# Patient Record
Sex: MALE | Race: WHITE | Employment: FULL TIME | URBAN - METROPOLITAN AREA
[De-identification: names, ages, dates, MRNs, and addresses within clinical notes are randomized per-mention and may not be internally consistent; named-entity substitution may affect disease eponyms.]

---

## 2021-03-13 ENCOUNTER — APPOINTMENT (OUTPATIENT)
Dept: GENERAL RADIOLOGY | Age: 56
DRG: 309 | End: 2021-03-13
Attending: STUDENT IN AN ORGANIZED HEALTH CARE EDUCATION/TRAINING PROGRAM
Payer: COMMERCIAL

## 2021-03-13 ENCOUNTER — HOSPITAL ENCOUNTER (INPATIENT)
Age: 56
LOS: 1 days | Discharge: HOME | DRG: 309 | End: 2021-03-14
Attending: STUDENT IN AN ORGANIZED HEALTH CARE EDUCATION/TRAINING PROGRAM | Admitting: HOSPITALIST
Payer: COMMERCIAL

## 2021-03-13 DIAGNOSIS — I48.91 ATRIAL FIBRILLATION WITH RVR (HCC): Primary | ICD-10-CM

## 2021-03-13 PROBLEM — F10.10 ETOH ABUSE: Status: ACTIVE | Noted: 2021-03-13

## 2021-03-13 PROBLEM — R00.2 PALPITATION: Status: ACTIVE | Noted: 2021-03-13

## 2021-03-13 LAB
ALBUMIN SERPL-MCNC: 3.8 G/DL (ref 3.5–5)
ALBUMIN/GLOB SERPL: 1.1 {RATIO} (ref 1.1–2.2)
ALP SERPL-CCNC: 50 U/L (ref 45–117)
ALT SERPL-CCNC: 28 U/L (ref 12–78)
ANION GAP SERPL CALC-SCNC: 4 MMOL/L (ref 5–15)
AST SERPL-CCNC: 26 U/L (ref 15–37)
BASOPHILS # BLD: 0.1 K/UL (ref 0–0.1)
BASOPHILS NFR BLD: 1 % (ref 0–1)
BILIRUB SERPL-MCNC: 0.6 MG/DL (ref 0.2–1)
BNP SERPL-MCNC: 115 PG/ML
BUN SERPL-MCNC: 19 MG/DL (ref 6–20)
BUN/CREAT SERPL: 18 (ref 12–20)
CALCIUM SERPL-MCNC: 9.2 MG/DL (ref 8.5–10.1)
CHLORIDE SERPL-SCNC: 103 MMOL/L (ref 97–108)
CO2 SERPL-SCNC: 29 MMOL/L (ref 21–32)
COMMENT, HOLDF: NORMAL
CREAT SERPL-MCNC: 1.05 MG/DL (ref 0.7–1.3)
DIFFERENTIAL METHOD BLD: ABNORMAL
EOSINOPHIL # BLD: 0.2 K/UL (ref 0–0.4)
EOSINOPHIL NFR BLD: 2 % (ref 0–7)
ERYTHROCYTE [DISTWIDTH] IN BLOOD BY AUTOMATED COUNT: 12.1 % (ref 11.5–14.5)
ETHANOL SERPL-MCNC: <10 MG/DL
GLOBULIN SER CALC-MCNC: 3.5 G/DL (ref 2–4)
GLUCOSE SERPL-MCNC: 95 MG/DL (ref 65–100)
HCT VFR BLD AUTO: 42.4 % (ref 36.6–50.3)
HGB BLD-MCNC: 15.1 G/DL (ref 12.1–17)
IMM GRANULOCYTES # BLD AUTO: 0 K/UL (ref 0–0.04)
IMM GRANULOCYTES NFR BLD AUTO: 0 % (ref 0–0.5)
LYMPHOCYTES # BLD: 3.8 K/UL (ref 0.8–3.5)
LYMPHOCYTES NFR BLD: 38 % (ref 12–49)
MAGNESIUM SERPL-MCNC: 2.3 MG/DL (ref 1.6–2.4)
MCH RBC QN AUTO: 33.7 PG (ref 26–34)
MCHC RBC AUTO-ENTMCNC: 35.6 G/DL (ref 30–36.5)
MCV RBC AUTO: 94.6 FL (ref 80–99)
MONOCYTES # BLD: 0.9 K/UL (ref 0–1)
MONOCYTES NFR BLD: 9 % (ref 5–13)
NEUTS SEG # BLD: 5 K/UL (ref 1.8–8)
NEUTS SEG NFR BLD: 50 % (ref 32–75)
NRBC # BLD: 0 K/UL (ref 0–0.01)
NRBC BLD-RTO: 0 PER 100 WBC
PLATELET # BLD AUTO: 337 K/UL (ref 150–400)
PMV BLD AUTO: 10.2 FL (ref 8.9–12.9)
POTASSIUM SERPL-SCNC: 3.8 MMOL/L (ref 3.5–5.1)
PROT SERPL-MCNC: 7.3 G/DL (ref 6.4–8.2)
RBC # BLD AUTO: 4.48 M/UL (ref 4.1–5.7)
SAMPLES BEING HELD,HOLD: NORMAL
SODIUM SERPL-SCNC: 136 MMOL/L (ref 136–145)
TROPONIN I SERPL-MCNC: <0.05 NG/ML
WBC # BLD AUTO: 10 K/UL (ref 4.1–11.1)

## 2021-03-13 PROCEDURE — 96374 THER/PROPH/DIAG INJ IV PUSH: CPT

## 2021-03-13 PROCEDURE — 85025 COMPLETE CBC W/AUTO DIFF WBC: CPT

## 2021-03-13 PROCEDURE — 74011250637 HC RX REV CODE- 250/637: Performed by: HOSPITALIST

## 2021-03-13 PROCEDURE — 65270000029 HC RM PRIVATE

## 2021-03-13 PROCEDURE — 71045 X-RAY EXAM CHEST 1 VIEW: CPT

## 2021-03-13 PROCEDURE — 74011000250 HC RX REV CODE- 250: Performed by: STUDENT IN AN ORGANIZED HEALTH CARE EDUCATION/TRAINING PROGRAM

## 2021-03-13 PROCEDURE — 84443 ASSAY THYROID STIM HORMONE: CPT

## 2021-03-13 PROCEDURE — 84484 ASSAY OF TROPONIN QUANT: CPT

## 2021-03-13 PROCEDURE — 83880 ASSAY OF NATRIURETIC PEPTIDE: CPT

## 2021-03-13 PROCEDURE — 80053 COMPREHEN METABOLIC PANEL: CPT

## 2021-03-13 PROCEDURE — 65660000000 HC RM CCU STEPDOWN

## 2021-03-13 PROCEDURE — 99284 EMERGENCY DEPT VISIT MOD MDM: CPT

## 2021-03-13 PROCEDURE — 36415 COLL VENOUS BLD VENIPUNCTURE: CPT

## 2021-03-13 PROCEDURE — 74011250636 HC RX REV CODE- 250/636: Performed by: STUDENT IN AN ORGANIZED HEALTH CARE EDUCATION/TRAINING PROGRAM

## 2021-03-13 PROCEDURE — 93005 ELECTROCARDIOGRAM TRACING: CPT

## 2021-03-13 PROCEDURE — 82077 ASSAY SPEC XCP UR&BREATH IA: CPT

## 2021-03-13 PROCEDURE — 83735 ASSAY OF MAGNESIUM: CPT

## 2021-03-13 PROCEDURE — 96361 HYDRATE IV INFUSION ADD-ON: CPT

## 2021-03-13 RX ORDER — SODIUM CHLORIDE 0.9 % (FLUSH) 0.9 %
5-40 SYRINGE (ML) INJECTION AS NEEDED
Status: DISCONTINUED | OUTPATIENT
Start: 2021-03-13 | End: 2021-03-14 | Stop reason: HOSPADM

## 2021-03-13 RX ORDER — SODIUM CHLORIDE 0.9 % (FLUSH) 0.9 %
5-40 SYRINGE (ML) INJECTION EVERY 8 HOURS
Status: DISCONTINUED | OUTPATIENT
Start: 2021-03-13 | End: 2021-03-14 | Stop reason: HOSPADM

## 2021-03-13 RX ORDER — ENOXAPARIN SODIUM 100 MG/ML
40 INJECTION SUBCUTANEOUS DAILY
Status: DISCONTINUED | OUTPATIENT
Start: 2021-03-14 | End: 2021-03-14

## 2021-03-13 RX ORDER — POLYETHYLENE GLYCOL 3350 17 G/17G
17 POWDER, FOR SOLUTION ORAL DAILY PRN
Status: DISCONTINUED | OUTPATIENT
Start: 2021-03-13 | End: 2021-03-14 | Stop reason: HOSPADM

## 2021-03-13 RX ORDER — ONDANSETRON 2 MG/ML
4 INJECTION INTRAMUSCULAR; INTRAVENOUS
Status: DISCONTINUED | OUTPATIENT
Start: 2021-03-13 | End: 2021-03-14 | Stop reason: HOSPADM

## 2021-03-13 RX ORDER — ACETAMINOPHEN 325 MG/1
650 TABLET ORAL
Status: DISCONTINUED | OUTPATIENT
Start: 2021-03-13 | End: 2021-03-14 | Stop reason: HOSPADM

## 2021-03-13 RX ORDER — ACETAMINOPHEN 650 MG/1
650 SUPPOSITORY RECTAL
Status: DISCONTINUED | OUTPATIENT
Start: 2021-03-13 | End: 2021-03-14 | Stop reason: HOSPADM

## 2021-03-13 RX ORDER — METOPROLOL TARTRATE 25 MG/1
12.5 TABLET, FILM COATED ORAL EVERY 12 HOURS
Status: DISCONTINUED | OUTPATIENT
Start: 2021-03-13 | End: 2021-03-14 | Stop reason: HOSPADM

## 2021-03-13 RX ORDER — DILTIAZEM HYDROCHLORIDE 5 MG/ML
10 INJECTION INTRAVENOUS
Status: COMPLETED | OUTPATIENT
Start: 2021-03-13 | End: 2021-03-13

## 2021-03-13 RX ORDER — PROMETHAZINE HYDROCHLORIDE 25 MG/1
12.5 TABLET ORAL
Status: DISCONTINUED | OUTPATIENT
Start: 2021-03-13 | End: 2021-03-14 | Stop reason: HOSPADM

## 2021-03-13 RX ADMIN — METOPROLOL TARTRATE 12.5 MG: 25 TABLET ORAL at 23:44

## 2021-03-13 RX ADMIN — DILTIAZEM HYDROCHLORIDE 10 MG: 5 INJECTION INTRAVENOUS at 22:28

## 2021-03-13 RX ADMIN — SODIUM CHLORIDE 1000 ML: 9 INJECTION, SOLUTION INTRAVENOUS at 22:25

## 2021-03-14 VITALS
TEMPERATURE: 96.8 F | HEART RATE: 58 BPM | OXYGEN SATURATION: 100 % | RESPIRATION RATE: 14 BRPM | WEIGHT: 150 LBS | DIASTOLIC BLOOD PRESSURE: 76 MMHG | BODY MASS INDEX: 23.54 KG/M2 | SYSTOLIC BLOOD PRESSURE: 131 MMHG | HEIGHT: 67 IN

## 2021-03-14 LAB
ALBUMIN SERPL-MCNC: 3.4 G/DL (ref 3.5–5)
ALBUMIN/GLOB SERPL: 1 {RATIO} (ref 1.1–2.2)
ALP SERPL-CCNC: 46 U/L (ref 45–117)
ALT SERPL-CCNC: 26 U/L (ref 12–78)
ANION GAP SERPL CALC-SCNC: 5 MMOL/L (ref 5–15)
AST SERPL-CCNC: 21 U/L (ref 15–37)
ATRIAL RATE: 133 BPM
BASOPHILS # BLD: 0.1 K/UL (ref 0–0.1)
BASOPHILS NFR BLD: 1 % (ref 0–1)
BILIRUB SERPL-MCNC: 0.6 MG/DL (ref 0.2–1)
BUN SERPL-MCNC: 17 MG/DL (ref 6–20)
BUN/CREAT SERPL: 18 (ref 12–20)
CALCIUM SERPL-MCNC: 8.3 MG/DL (ref 8.5–10.1)
CALCULATED R AXIS, ECG10: 26 DEGREES
CALCULATED T AXIS, ECG11: -40 DEGREES
CHLORIDE SERPL-SCNC: 107 MMOL/L (ref 97–108)
CHOLEST SERPL-MCNC: 169 MG/DL
CO2 SERPL-SCNC: 26 MMOL/L (ref 21–32)
CREAT SERPL-MCNC: 0.96 MG/DL (ref 0.7–1.3)
DIAGNOSIS, 93000: NORMAL
DIFFERENTIAL METHOD BLD: NORMAL
EOSINOPHIL # BLD: 0.3 K/UL (ref 0–0.4)
EOSINOPHIL NFR BLD: 3 % (ref 0–7)
ERYTHROCYTE [DISTWIDTH] IN BLOOD BY AUTOMATED COUNT: 12.1 % (ref 11.5–14.5)
GLOBULIN SER CALC-MCNC: 3.4 G/DL (ref 2–4)
GLUCOSE SERPL-MCNC: 122 MG/DL (ref 65–100)
HCT VFR BLD AUTO: 40.1 % (ref 36.6–50.3)
HDLC SERPL-MCNC: 68 MG/DL
HDLC SERPL: 2.5 {RATIO} (ref 0–5)
HGB BLD-MCNC: 14.3 G/DL (ref 12.1–17)
IMM GRANULOCYTES # BLD AUTO: 0 K/UL (ref 0–0.04)
IMM GRANULOCYTES NFR BLD AUTO: 0 % (ref 0–0.5)
LDLC SERPL CALC-MCNC: 81.8 MG/DL (ref 0–100)
LIPID PROFILE,FLP: NORMAL
LYMPHOCYTES # BLD: 3 K/UL (ref 0.8–3.5)
LYMPHOCYTES NFR BLD: 31 % (ref 12–49)
MAGNESIUM SERPL-MCNC: 2.1 MG/DL (ref 1.6–2.4)
MCH RBC QN AUTO: 33.9 PG (ref 26–34)
MCHC RBC AUTO-ENTMCNC: 35.7 G/DL (ref 30–36.5)
MCV RBC AUTO: 95 FL (ref 80–99)
MONOCYTES # BLD: 0.9 K/UL (ref 0–1)
MONOCYTES NFR BLD: 9 % (ref 5–13)
NEUTS SEG # BLD: 5.5 K/UL (ref 1.8–8)
NEUTS SEG NFR BLD: 56 % (ref 32–75)
NRBC # BLD: 0 K/UL (ref 0–0.01)
NRBC BLD-RTO: 0 PER 100 WBC
PLATELET # BLD AUTO: 302 K/UL (ref 150–400)
PMV BLD AUTO: 10.5 FL (ref 8.9–12.9)
POTASSIUM SERPL-SCNC: 3.9 MMOL/L (ref 3.5–5.1)
PROT SERPL-MCNC: 6.8 G/DL (ref 6.4–8.2)
Q-T INTERVAL, ECG07: 268 MS
QRS DURATION, ECG06: 90 MS
QTC CALCULATION (BEZET), ECG08: 383 MS
RBC # BLD AUTO: 4.22 M/UL (ref 4.1–5.7)
SODIUM SERPL-SCNC: 138 MMOL/L (ref 136–145)
TRIGL SERPL-MCNC: 96 MG/DL (ref ?–150)
TSH SERPL DL<=0.05 MIU/L-ACNC: 3.04 UIU/ML (ref 0.36–3.74)
VENTRICULAR RATE, ECG03: 123 BPM
VLDLC SERPL CALC-MCNC: 19.2 MG/DL
WBC # BLD AUTO: 9.8 K/UL (ref 4.1–11.1)

## 2021-03-14 PROCEDURE — 80053 COMPREHEN METABOLIC PANEL: CPT

## 2021-03-14 PROCEDURE — 74011250637 HC RX REV CODE- 250/637: Performed by: HOSPITALIST

## 2021-03-14 PROCEDURE — 83735 ASSAY OF MAGNESIUM: CPT

## 2021-03-14 PROCEDURE — 74011250637 HC RX REV CODE- 250/637: Performed by: STUDENT IN AN ORGANIZED HEALTH CARE EDUCATION/TRAINING PROGRAM

## 2021-03-14 PROCEDURE — 36415 COLL VENOUS BLD VENIPUNCTURE: CPT

## 2021-03-14 PROCEDURE — 85025 COMPLETE CBC W/AUTO DIFF WBC: CPT

## 2021-03-14 PROCEDURE — 74011250636 HC RX REV CODE- 250/636: Performed by: HOSPITALIST

## 2021-03-14 PROCEDURE — 80061 LIPID PANEL: CPT

## 2021-03-14 RX ORDER — ASPIRIN 81 MG/1
81 TABLET ORAL DAILY
Qty: 30 TAB | Refills: 1 | Status: SHIPPED | OUTPATIENT
Start: 2021-04-14 | End: 2021-05-14

## 2021-03-14 RX ORDER — SODIUM CHLORIDE 9 MG/ML
100 INJECTION, SOLUTION INTRAVENOUS CONTINUOUS
Status: DISCONTINUED | OUTPATIENT
Start: 2021-03-14 | End: 2021-03-14 | Stop reason: HOSPADM

## 2021-03-14 RX ORDER — METOPROLOL TARTRATE 25 MG/1
12.5 TABLET, FILM COATED ORAL EVERY 12 HOURS
Qty: 30 TAB | Refills: 1 | Status: SHIPPED | OUTPATIENT
Start: 2021-03-14 | End: 2021-04-13

## 2021-03-14 RX ADMIN — SODIUM CHLORIDE 100 ML/HR: 9 INJECTION, SOLUTION INTRAVENOUS at 00:51

## 2021-03-14 RX ADMIN — METOPROLOL TARTRATE 12.5 MG: 25 TABLET ORAL at 09:00

## 2021-03-14 RX ADMIN — APIXABAN 5 MG: 5 TABLET, FILM COATED ORAL at 10:00

## 2021-03-14 RX ADMIN — Medication 10 ML: at 00:52

## 2021-03-14 NOTE — PROGRESS NOTES
CM consulted to check price of Eliquis prior to pt discharging. CM contacted pt pharmacy and the Eliquis is covered with a ZERO copay. CM informed MD via perfect serv and also inform floor nurse. No additional CM needs identified at this time. Long Potter  [place], MSW  [address] [facility]

## 2021-03-14 NOTE — ED PROVIDER NOTES
EMERGENCY DEPARTMENT HISTORY AND PHYSICAL EXAM      Date: 3/13/2021  Patient Name: Dillan Whitaker    History of Presenting Illness     Chief Complaint   Patient presents with   • Rapid Heart Rate     reports rapid heart rate tonight. notes pulse has been .         HPI: Dillan Whitaker, 55 y.o. male presents to the ED with cc of palpitations.  He states that over the last 2 days, he has not been eating as much as he usually does, he was then working outside, cutting trees and hauling mulch.  He then began to feel palpitations after this.  He took his pulse on his phone, it said that it was in the 130s and that his heart rate was irregular.  He denies any associated chest pain or shortness of breath.  He did feel weaker than usual.  No abdominal pain, vomiting or diarrhea, denies any recent fevers or cough or any recent illness.  Denies any prior history of A. fib or any dysrhythmia.  He drinks about 3 alcoholic beverages a day, drinks 2 cups of coffee in the morning, no recent change in this.  No other new medications, no other new substances.  Reports a history of diverticulitis, however no other medical problems.  No leg pain or leg swelling, no recent periods of immobilization.          There are no other complaints, changes, or physical findings at this time.    PCP: No primary care provider on file.    No current facility-administered medications on file prior to encounter.      No current outpatient medications on file prior to encounter.       Past History     Past Medical History:  No past medical history on file.    Past Surgical History:  No past surgical history on file.    Family History:  No family history on file.    Social History:  Social History     Tobacco Use   • Smoking status: Not on file   Substance Use Topics   • Alcohol use: Not on file   • Drug use: Not on file       Allergies:  Allergies   Allergen Reactions   • Pcn [Penicillins] Unknown (comments)         Review of Systems   no fever  No  eye pain  No ear pain  no shortness of breath  no chest pain  no abdominal pain  no dysuria  no leg pain  No rash  No lymphadenopathy  No weight loss    Physical Exam   Physical Exam  Constitutional:       General: He is not in acute distress. HENT:      Head: Normocephalic and atraumatic. Eyes:      Extraocular Movements: Extraocular movements intact. Neck:      Musculoskeletal: Neck supple. Cardiovascular:      Rate and Rhythm: Tachycardia present. Rhythm irregular. Pulmonary:      Effort: Pulmonary effort is normal.      Breath sounds: Normal breath sounds. Abdominal:      Palpations: Abdomen is soft. Tenderness: There is no abdominal tenderness. Musculoskeletal:         General: No swelling or tenderness. Skin:     General: Skin is warm and dry. Neurological:      General: No focal deficit present. Mental Status: He is alert and oriented to person, place, and time.    Psychiatric:         Mood and Affect: Mood normal.         Diagnostic Study Results     Labs -     Recent Results (from the past 24 hour(s))   EKG, 12 LEAD, INITIAL    Collection Time: 03/13/21  9:25 PM   Result Value Ref Range    Ventricular Rate 123 BPM    Atrial Rate 133 BPM    QRS Duration 90 ms    Q-T Interval 268 ms    QTC Calculation (Bezet) 383 ms    Calculated R Axis 26 degrees    Calculated T Axis -40 degrees    Diagnosis       Atrial fibrillation with rapid ventricular response  Abnormal QRS-T angle, consider primary T wave abnormality  No previous ECGs available     CBC WITH AUTOMATED DIFF    Collection Time: 03/13/21  9:47 PM   Result Value Ref Range    WBC 10.0 4.1 - 11.1 K/uL    RBC 4.48 4.10 - 5.70 M/uL    HGB 15.1 12.1 - 17.0 g/dL    HCT 42.4 36.6 - 50.3 %    MCV 94.6 80.0 - 99.0 FL    MCH 33.7 26.0 - 34.0 PG    MCHC 35.6 30.0 - 36.5 g/dL    RDW 12.1 11.5 - 14.5 %    PLATELET 276 099 - 525 K/uL    MPV 10.2 8.9 - 12.9 FL    NRBC 0.0 0  WBC    ABSOLUTE NRBC 0.00 0.00 - 0.01 K/uL    NEUTROPHILS 50 32 - 75 %    LYMPHOCYTES 38 12 - 49 %    MONOCYTES 9 5 - 13 %    EOSINOPHILS 2 0 - 7 %    BASOPHILS 1 0 - 1 %    IMMATURE GRANULOCYTES 0 0.0 - 0.5 %    ABS. NEUTROPHILS 5.0 1.8 - 8.0 K/UL    ABS. LYMPHOCYTES 3.8 (H) 0.8 - 3.5 K/UL    ABS. MONOCYTES 0.9 0.0 - 1.0 K/UL    ABS. EOSINOPHILS 0.2 0.0 - 0.4 K/UL    ABS. BASOPHILS 0.1 0.0 - 0.1 K/UL    ABS. IMM. GRANS. 0.0 0.00 - 0.04 K/UL    DF AUTOMATED         Radiologic Studies -   No orders to display     CT Results  (Last 48 hours)    None        CXR Results  (Last 48 hours)    None            Medical Decision Making   I am the first provider for this patient. I reviewed the vital signs, available nursing notes, past medical history, past surgical history, family history and social history. Vital Signs-Reviewed the patient's vital signs. Patient Vitals for the past 24 hrs:   Temp Pulse Resp BP SpO2   03/13/21 2201     97 %   03/13/21 2152 98.6 °F (37 °C) (!) 131 12 129/88 99 %   03/13/21 2121 98.1 °F (36.7 °C) (!) 120 16 (!) 164/119 97 %         Provider Notes (Medical Decision Making):   70-year-old male presenting with palpitations and generalized weakness. Symptoms are concerning for dysrhythmia, volume depletion, electrolyte or metabolic abnormalities. He denies any chest pain or shortness of breath, symptoms are atypical for ACS or CHF. He denies any prior history of atrial fibrillation. ED Course:     Initial assessment performed. The patients presenting problems have been discussed, and they are in agreement with the care plan formulated and outlined with them. I have encouraged them to ask questions as they arise throughout their visit. EKG is performed at 21: 25, shows atrial fibrillation with rapid ventricular response at a rate of 123, QRS 90, QTc 383, upright axis, no ST segment elevation or depression concerning for ACS, this is interpreted as atrial fibrillation with rapid ventricular response.     Patient will be given 1 L normal saline bolus and 10 mg of diltiazem IV. CBC negative for leukocytosis or anemia, basic metabolic panel with normal renal function, no worrisome electrolyte abnormalities, troponin is negative, BNP is not significantly elevated, chest x-ray unremarkable. On reevaluation, patient is resting comfortably, denies complaints, he is now rate controlled with heart rate in the 80s to 90s, still in atrial fibrillation per read on the monitor. Blood pressure remained stable. He will be admitted to medicine. Critical Care Time:         Disposition:  Admit    PLAN:  1. There are no discharge medications for this patient.     2.   Follow-up Information    None       Return to ED if worse     Diagnosis     Clinical Impression: Acute atrial fibrillation with rapid ventricular rate

## 2021-03-14 NOTE — ED TRIAGE NOTES
2155  PT complains of fast heart rate. PT felt weak and drained from yard work. 2354  PT lying in bed resting, denies any pain or discomfort. Bed in the lowest level and locked. Call bell within reach. 0054  PT lying in bed quietly. PT denies pain or discomfort. Bed in the lowest level and locked. Call bell and urinal within reach. 0100 Daylight savings time starts.    0302  TRANSFER - OUT REPORT:    Verbal report given to TRISTAN Bates(name) on Bev Betancur  being transferred to [facility](unit) for routine progression of care       Report consisted of patients Situation, Background, Assessment and   Recommendations(SBAR). Information from the following report(s) SBAR, Kardex, ED Summary, Intake/Output and MAR was reviewed with the receiving nurse. Lines:   Peripheral IV 03/14/21 Left;Proximal Forearm (Active)   Site Assessment Clean, dry, & intact 03/14/21 0115   Phlebitis Assessment 0 03/14/21 0115   Infiltration Assessment 0 03/14/21 0115   Dressing Status Clean, dry, & intact 03/14/21 0115   Dressing Type Tape;Transparent 03/14/21 0115   Hub Color/Line Status Pink;Flushed;Patent 03/14/21 0115   Alcohol Cap Used No 03/14/21 0115        Opportunity for questions and clarification was provided.       Patient transported with:   Monitor  Registered Nurse

## 2021-03-14 NOTE — DISCHARGE INSTRUCTIONS
Patient Education   Patient Education   Patient Follow Up Instructions: Activity: Activity as tolerated  Diet: Regular Diet  Wound Care: None needed  Follow-up with cardiology in 2 week. Please take Eliquis for one month and then start baby Aspirin   Follow-up tests/labs: ECHO      Apixaban (By mouth)   Apixaban (a-PIX-a-ban)  Treats and prevents blood clots. This medicine is a blood thinner. Brand Name(s): Eliquis   There may be other brand names for this medicine. When This Medicine Should Not Be Used: This medicine is not right for everyone. Do not use it if you had an allergic reaction to apixaban or you have active bleeding. How to Use This Medicine:   Tablet  · Your doctor will tell you how much medicine to use. Do not use more than directed. · If you are not able to swallow the tablets whole, they may be crushed and mixed in water, 5% dextrose in water (D5W), apple juice, or applesauce. The crushed tablets may be mixed with 60 mL of water or D5W dose and given through a nasogastric tube (NGT). · This medicine should come with a Medication Guide. Ask your pharmacist for a copy if you do not have one. · Missed dose: Take a dose as soon as you remember. If it is almost time for your next dose, wait until then and take a regular dose. Do not take extra medicine to make up for a missed dose. · Store the medicine in a closed container at room temperature, away from heat, moisture, and direct light. Drugs and Foods to Avoid:   Ask your doctor or pharmacist before using any other medicine, including over-the-counter medicines, vitamins, and herbal products. · Some medicines can affect how apixaban works.  Tell your doctor if you are using any of the following:   ¨ Carbamazepine, clarithromycin, itraconazole, ketoconazole, phenytoin, rifampin, ritonavir, Misael's wort  ¨ Blood thinner (including clopidogrel, heparin, prasugrel, warfarin)  ¨ Medicine to treat depression  ¨ NSAID pain or arthritis medicine (including aspirin, celecoxib, diclofenac, ibuprofen, naproxen)  Warnings While Using This Medicine:   · Tell your doctor if you are pregnant or breastfeeding, or if you have kidney disease, liver disease, bleeding problems, or an artificial heart valve. · Do not stop using this medicine suddenly without asking your doctor. You might have a higher risk of stroke for a short time after you stop using this medicine. · This medicine increases your risk for bleeding that can become serious if not controlled. You may also bruise easily, and it may take longer than usual for bleeding to stop. · This medicine may increase your risk for blood clots in your spine or back if you undergo an epidural or spinal puncture. This could lead to paralysis. Tell your doctor if you ever had spine problems or back surgery. · Tell any doctor or dentist who treats you that you are using this medicine. With your doctor's supervision, you may need to stop using this medicine several days before you have surgery or medical tests. · Your doctor will do lab tests at regular visits to check on the effects of this medicine. Keep all appointments. · Keep all medicine out of the reach of children. Never share your medicine with anyone. Possible Side Effects While Using This Medicine:   Call your doctor right away if you notice any of these side effects:  · Allergic reaction: Itching or hives, swelling in your face or hands, swelling or tingling in your mouth or throat, chest tightness, trouble breathing  · Change in how much or how often you urinate, red or pink urine  · Chest pain, trouble breathing  · Coughing up blood, vomiting blood or material that looks like coffee grounds  · Numbness, tingling, or muscle weakness in your legs or feet  · Red or black, tarry stools  · Unusual bleeding, bruising, or weakness  If you notice other side effects that you think are caused by this medicine, tell your doctor.    Call your doctor for medical advice about side effects. You may report side effects to FDA at 5-926-FDA-6118  © 2017 [address] Information is for End User's use only and may not be sold, redistributed or otherwise used for commercial purposes. The above information is an  only. It is not intended as medical advice for individual conditions or treatments. Talk to your doctor, nurse or pharmacist before following any medical regimen to see if it is safe and effective for you. Atrial Fibrillation: Care Instructions  Your Care Instructions     Atrial fibrillation is an irregular and often fast heartbeat. Treating this condition is important for several reasons. It can cause blood clots, which can travel from your heart to your brain and cause a stroke. If you have a fast heartbeat, you may feel lightheaded, dizzy, and weak. An irregular heartbeat can also increase your risk for heart failure. Atrial fibrillation is often the result of another heart condition, such as high blood pressure or coronary artery disease. Making changes to improve your heart condition will help you stay healthy and active. Follow-up care is a key part of your treatment and safety. Be sure to make and go to all appointments, and call your doctor if you are having problems. It's also a good idea to know your test results and keep a list of the medicines you take. How can you care for yourself at home? Medicines    · Take your medicines exactly as prescribed. Call your doctor if you think you are having a problem with your medicine. You will get more details on the specific medicines your doctor prescribes.     · If your doctor has given you a blood thinner to prevent a stroke, be sure you get instructions about how to take your medicine safely.  Blood thinners can cause serious bleeding problems.     · Do not take any vitamins, over-the-counter drugs, or herbal products without talking to your doctor first.   Lifestyle changes    · Do not smoke. Smoking can increase your chance of a stroke and heart attack. If you need help quitting, talk to your doctor about stop-smoking programs and medicines. These can increase your chances of quitting for good.     · Eat a heart-healthy diet.     · Stay at a healthy weight. Lose weight if you need to.     · Limit alcohol to 2 drinks a day for men and 1 drink a day for women. Too much alcohol can cause health problems.     · Avoid colds and flu. Get a pneumococcal vaccine shot. If you have had one before, ask your doctor whether you need another dose. Get a flu shot every year. If you must be around people with colds or flu, wash your hands often. Activity    · If your doctor recommends it, get more exercise. Walking is a good choice. Bit by bit, increase the amount you walk every day. Try for at least 30 minutes on most days of the week. You also may want to swim, bike, or do other activities. Your doctor may suggest that you join a cardiac rehabilitation program so that you can have help increasing your physical activity safely.     · Start light exercise if your doctor says it is okay. Even a small amount will help you get stronger, have more energy, and manage stress. Walking is an easy way to get exercise. Start out by walking a little more than you did in the hospital. Gradually increase the amount you walk.     · When you exercise, watch for signs that your heart is working too hard. You are pushing too hard if you cannot talk while you are exercising. If you become short of breath or dizzy or have chest pain, sit down and rest immediately.     · Check your pulse regularly. Place two fingers on the artery at the palm side of your wrist, in line with your thumb. If your heartbeat seems uneven or fast, talk to your doctor. When should you call for help? Call 911 anytime you think you may need emergency care. For example, call if:    · You have symptoms of a heart attack.  These may include:  ? Chest pain or pressure, or a strange feeling in the chest.  ? Sweating. ? Shortness of breath. ? Nausea or vomiting. ? Pain, pressure, or a strange feeling in the back, neck, jaw, or upper belly or in one or both shoulders or arms. ? Lightheadedness or sudden weakness. ? A fast or irregular heartbeat. After you call 911, the  may tell you to chew 1 adult-strength or 2 to 4 low-dose aspirin. Wait for an ambulance. Do not try to drive yourself.     · You have symptoms of a stroke. These may include:  ? Sudden numbness, tingling, weakness, or loss of movement in your face, arm, or leg, especially on only one side of your body. ? Sudden vision changes. ? Sudden trouble speaking. ? Sudden confusion or trouble understanding simple statements. ? Sudden problems with walking or balance. ? A sudden, severe headache that is different from past headaches.     · You passed out (lost consciousness). Call your doctor now or seek immediate medical care if:    · You have new or increased shortness of breath.     · You feel dizzy or lightheaded, or you feel like you may faint.     · Your heart rate becomes irregular.     · You can feel your heart flutter in your chest or skip heartbeats. Tell your doctor if these symptoms are new or worse. Watch closely for changes in your health, and be sure to contact your doctor if you have any problems. Where can you learn more? Go to http://www.gray.com/  Enter U020 in the search box to learn more about \"Atrial Fibrillation: Care Instructions. \"  Current as of: December 16, 2019               Content Version: 12.6  © 7875-2520 [employer]. Care instructions adapted under license by [employer] (which disclaims liability or warranty for this information).  If you have questions about a medical condition or this instruction, always ask your healthcare professional. Sola Hernandez disclaims any warranty or liability for your use of this information.

## 2021-03-14 NOTE — CONSULTS
IP Cardiology Consult       Date of consult:  03/14/21  Date of admission: 3/13/2021  Primary Cardiologist: MARKO  Physician Requesting consult: Dr Della Barger:    1. Afib with RVR, paroxysmal, resolved after Dilt IV x1  2. Diverticulitis    3. Alcohol use             Recommendations:    Mr Cali Hopkins has Afib with RVR and self converted to NSR after dose of IV Dilt   Reports he was not eating well due to concern for diverticulitis episode - early symptoms, and he also had alcohol 4-5 beverages and 2 cups of coffee   His CHADS2 vasc score is 0  Discussed about Afib, risk and risk and benefit of anticoagulation   Currently on Metoprolol 12.5 mg BID and HR borderline     Cont metoprolol 12.5 mg po BID for now for 2-4 wks and if HR low and no further episode then prn   Will start him on Apixaban 5 mg po BID x1 month only  then ASA as his CHADS vasc score is 0   Check echocardiogram, if delays then can be done as OP    He monitors his HR and Heart rhythm diligently at home, otherwise monitor can be considered   OP follow up in 2 wks    Should be able to be discharged home              [x]        High complexity decision making was performed      CC / Reason for consult:  Afib with RVR    History of the presenting illness:  Jossie Segura is a 54 y.o.   With history of intermittent diverticulitis for many years   Thought 2 days ago he had early sign of diverticulitis so not eating as he usually does with this symptoms and drinking water   He felt elevated HR and he checked his heart rhythm on Phone and it was irregular and HR was 130s  Called PCP and advised to go to ER  Noted to have Afib with RVR, given Dilt 10 mg, and converted to NSR   He also reports to have 3-5 alcoholic beverage yesterday, does drink regularity  Very diligent about checking his HR and heart rhythm on his phone   Had test for GRETA few years ago and was negative   Does regular exercise without any limiting symptoms          Medical history: Diverticulitis     Surgery history: No major surgery      Family history:  Mother had Afib     Social history: No smoking   Alcohol daily 3 drinks   Works with [employer]        ROS      Total of 12 systems reviewed, all systems review was negative except Pertinent Positives included in HPI     Visit Vitals  /76 (BP 1 Location: Left arm, BP Patient Position: At rest)   Pulse (!) 58   Temp 96.8 °F (36 °C)   Resp 14   Ht 5' 7\" (1.702 m)   Wt 68 kg (150 lb)   SpO2 100%   BMI 23.49 kg/m²      Examination:     General: Alert + Oriented x3, no acute distress   HEENT: Normocephalic aromatic, MMM   Neck: Supple, JVP- not well appreciated   RS: Non labored, clear   CVS: Regular rate and rhythm, S1S2, no murmur   Abd: Soft, non tender, non distended   Lower extremity: Warm to touch, Edema- None   Skin: Warm and dry, No significant bruises or rash   CNS: Oriented x3, no focal neuro deficit     Lab review:  BMP:   Lab Results   Component Value Date/Time     03/14/2021 01:10 AM    K 3.9 03/14/2021 01:10 AM     03/14/2021 01:10 AM    CO2 26 03/14/2021 01:10 AM    AGAP 5 03/14/2021 01:10 AM     (H) 03/14/2021 01:10 AM    BUN 17 03/14/2021 01:10 AM    CREA 0.96 03/14/2021 01:10 AM    GFRAA >60 03/14/2021 01:10 AM    GFRNA >60 03/14/2021 01:10 AM        CBC:  Lab Results   Component Value Date/Time    WBC 9.8 03/14/2021 01:10 AM    HGB 14.3 03/14/2021 01:10 AM    HCT 40.1 03/14/2021 01:10 AM    PLATELET 701 14/58/3506 01:10 AM    MCV 95.0 03/14/2021 01:10 AM       All Cardiac Markers in the last 24 hours:    Lab Results   Component Value Date/Time    TROIQ <0.05 03/13/2021 09:47 PM    BNPNT 115 03/13/2021 09:47 PM       Data review:    Tele: NSR    EKG tracing personally reviewed: Atrial fibrillation with RVR    Echocardiogram: Pending     Other cardiac testing:    Other imaging:        Signed:  Destinee Flower MD  Interventional Cardiology  3/14/2021

## 2021-03-14 NOTE — DISCHARGE SUMMARY
Hospitalist Discharge Summary     Patient ID:  Lydia Quiles  [id number]  80 y.o.  1965  3/13/2021    PCP on record: Tyronne Burkitt, MD    Admit date: 3/13/2021  Discharge date and time: 3/14/2021    DISCHARGE DIAGNOSIS:  PAF  Recurrent diverticulitis  Alcohol use  Asymptomatic bradycardia     CONSULTATIONS:  IP CONSULT TO HOSPITALIST  IP CONSULT TO CARDIOLOGY    Excerpted HPI from H&P of [place] MD:  Ree Me y. o. male presents to the ED with cc of palpitations. Arcelia Whalen states that over the last 2 days, he has not been eating as much as he usually does, he was then working outside, cutting trees and hauling [employer] then began to feel palpitations after this. Arcelia Whalen took his pulse on his phone, it said that it was in the 130s and that his heart rate was irregular.  He denies any associated chest pain or shortness of breath.  He did feel weaker than usual.  No abdominal pain, vomiting or diarrhea, denies any recent fevers or cough or any recent illness.  Denies any prior history of A. fib or any dysrhythmia.  He drinks about 3 alcoholic beverages a day, drinks 2 cups of coffee in the morning, no recent change in this.  No other new medications, no other new substances.  Reports a history of diverticulitis, however no other medical problems.  No leg pain or leg swelling, no recent periods of immobilization.     Per patient he has a history of recurrent diverticulitis and the last time he had back radiculitis 2 weeks ago when he was treated with ciprofloxacin for 10 days which she completed a week ago. Currently denies nausea vomiting diarrhea or abdominal pain   ______________________________________________________________________  DISCHARGE SUMMARY/HOSPITAL COURSE:  for full details see H&P, daily progress notes, labs, consult notes.      PAF  Low CHADSVASC, cardio recs to give Eliquis for one month as per Cardio, then baby aspirin daily  F/u w cardio in 2 weeks  Pt check his HR/Rhythm on his I Phone, may need holter, will f/u w cardio re that  TSH wnl  ECHO as outpt  Advised to cut down on Etoh and caffeinated beverages. Not in withdrawals    HR goes down to 50s at baseline, as per pt  Asymptomatic maribell on BB, continue and f/u w cardio    Recurrent diverticulitis, stated that he got 4-5 course of ABx for it in the pasts 15 years. Advised to f/u Gen Sx for partial colectomy but he is not interested, nothing acute at the moment and no need for ABx  _____________________________________________________________________  Patient seen and examined by me on discharge day. Pertinent Findings:  Gen:    Not in distress  Chest: Clear lungs  CVS:   Regular rhythm. No edema  Abd:  Soft, not distended, not tender  Neuro:  AAOX3,   _______________________________________________________________________  DISCHARGE MEDICATIONS:   Current Discharge Medication List      START taking these medications    Details   apixaban (ELIQUIS) 5 mg tablet Take 1 Tab by mouth every twelve (12) hours for 30 days. Qty: 60 Tab, Refills: 0      metoprolol tartrate (LOPRESSOR) 25 mg tablet Take 0.5 Tabs by mouth every twelve (12) hours for 30 days. Qty: 30 Tab, Refills: 1      aspirin delayed-release 81 mg tablet Take 1 Tab by mouth daily for 30 days. Start after one month of Eliquis  Start date is 4/14/2021  Qty: 30 Tab, Refills: 1               Patient Follow Up Instructions: Activity: Activity as tolerated  Diet: Regular Diet  Wound Care: None needed    Follow-up with cardiology in 2 week.   Follow-up tests/labs: ECHO    Follow-up Information     Follow up With Specialties Details Why MD Y[address] [facility] [address] [facility]      Qiana Rose MD Cardiology, Internal Medicine In 2 weeks  [address] [facility]  [address]  [address] [phone] ________________________________________________________________    Risk of deterioration: Low    Condition at Discharge:  Stable  __________________________________________________________________    Disposition  Home with family, no needs    ____________________________________________________________________    Code Status: Full Code  ___________________________________________________________________      Total time in minutes spent coordinating this discharge (includes going over instructions, follow-up, prescriptions, and preparing report for sign off to her PCP) :  >30 minutes    Signed:  Kobi Fletcher MD

## 2021-03-14 NOTE — H&P
Hospitalist Admission Note    NAME: Rex Ayala   :  1965   MRN:  [id number]     Date/Time:  3/13/2021 11:16 PM    Patient PCP: Edna Herman MD  _____________________________________________________________________  Given the patient's current clinical presentation, I have a high level of concern for decompensation if discharged from the emergency department. Complex decision making was performed, which includes reviewing the patient's available past medical records, laboratory results, and x-ray films. My assessment of this patient's clinical condition and my plan of care is as follows. Assessment / Plan:  Provider Notes (Medical Decision Making):   55-year-old male presenting with palpitations and generalized weakness found to have  afib with RVR. Now rate is controlled after  1 L normal saline bolus and 10 mg of diltiazem IV in ed. Afib with RVR:Newly diagnosed   EKG shows atrial fibrillation with rapid ventricular response at a rate of 123, QRS 90, QTc 383, upright axis, no ST segment elevation or depression    Chest x-ray no acute cardiopulmonary pathology    Admit to hospitalist service, telemetry, serial cardiac enzyme, check U tox, check alcohol level, continue low-dose beta-blocker and adjust as needed, check 2D echocardiogram, check A1c and fasting lipid profile, cardiology consulted,   Will check TSH level      etoh use advised cessation    Code Status: full  Surrogate Decision Maker:  Nikolas Rachelle [phone]     DVT Prophylaxis: sq lovenox  GI Prophylaxis: not indicated    Baseline: independent        Subjective:   CHIEF COMPLAINT:  PT complains of fast heart rate. PT felt weak and drained from yard work. notes pulse has been . HISTORY OF PRESENT ILLNESS:     Rex Ayala, 54 y.o. male presents to the ED with cc of palpitations.   He states that over the last 2 days, he has not been eating as much as he usually does, he was then working outside, cutting trees and hauling mulch. He then began to feel palpitations after this. He took his pulse on his phone, it said that it was in the 130s and that his heart rate was irregular. He denies any associated chest pain or shortness of breath. He did feel weaker than usual.  No abdominal pain, vomiting or diarrhea, denies any recent fevers or cough or any recent illness. Denies any prior history of A. fib or any dysrhythmia. He drinks about 3 alcoholic beverages a day, drinks 2 cups of coffee in the morning, no recent change in this. No other new medications, no other new substances. Reports a history of diverticulitis, however no other medical problems. No leg pain or leg swelling, no recent periods of immobilization. Per patient he has a history of recurrent diverticulitis and the last time he had back radiculitis 2 weeks ago when he was treated with ciprofloxacin for 10 days which she completed a week ago. Currently denies nausea vomiting diarrhea or abdominal pain         There are no other complaints, changes, or physical findings at this time. PCP: No primary care provider on file. We were asked to admit for work up and evaluation of the above problems. PMH/PSH: DENIES except history of recurrent diverticulitis  SH: Uses alcohol, denies tobacco and drugs  FH: nc except mother has A. fib    Vital Signs-Reviewed the patient's vital signs. Patient Vitals for the past 24 hrs:    Temp Pulse Resp BP SpO2   03/13/21 2201     97 %   03/13/21 2152 98.6 °F (37 °C) (!) 131 12 129/88 99 %   03/13/21 2121 98.1 °F (36.7 °C) (!) 120 16 (!) 164/119 97 %               Allergies   Allergen Reactions    Pcn [Penicillins] Unknown (comments)        Prior to Admission medications    Not on File       REVIEW OF SYSTEMS:     I am not able to complete the review of systems because:    The patient is intubated and sedated    The patient has altered mental status due to his acute medical problems    The patient has baseline aphasia from prior stroke(s)    The patient has baseline dementia and is not reliable historian    The patient is in acute medical distress and unable to provide information           Total of 12 systems reviewed as follows:       POSITIVE= underlined text  Negative = text not underlined  General:  fever, chills, sweats, generalized weakness, weight loss/gain,      loss of appetite   Eyes:    blurred vision, eye pain, loss of vision, double vision  ENT:    rhinorrhea, pharyngitis   Respiratory:   cough, sputum production, SOB, LOPEZ, wheezing, pleuritic pain   Cardiology:   chest pain, palpitations, orthopnea, PND, edema, syncope   Gastrointestinal:  abdominal pain , N/V, diarrhea, dysphagia, constipation, bleeding   Genitourinary:  frequency, urgency, dysuria, hematuria, incontinence   Muskuloskeletal :  arthralgia, myalgia, back pain  Hematology:  easy bruising, nose or gum bleeding, lymphadenopathy   Dermatological: rash, ulceration, pruritis, color change / jaundice  Endocrine:   hot flashes or polydipsia   Neurological:  headache, dizziness, confusion, focal weakness, paresthesia,     Speech difficulties, memory loss, gait difficulty  Psychological: Feelings of anxiety, depression, agitation    Objective:   VITALS:    Visit Vitals  BP (!) 131/94   Pulse (!) 109   Temp 98.6 °F (37 °C)   Resp 12   Ht 5' 7\" (1.702 m)   Wt 68 kg (150 lb)   SpO2 97%   BMI 23.49 kg/m²       PHYSICAL EXAM:    General:    Alert, cooperative, no distress, appears stated age. HEENT: Atraumatic, anicteric sclerae, pink conjunctivae     No oral ulcers, mucosa moist, throat clear, dentition fair  Neck:  Supple, symmetrical,  thyroid: non tender  Lungs:   Clear to auscultation bilaterally. No Wheezing or Rhonchi. No rales. Chest wall:  No tenderness  No Accessory muscle use. Heart:   IR IRegular  rhythm,  No  murmur   No edema  Abdomen:   Soft, non-tender. Not distended.   Bowel sounds normal  Extremities: No cyanosis. No clubbing,      Skin turgor normal, Capillary refill normal, Radial dial pulse 2+  Skin:     Not pale. Not Jaundiced  No rashes   Psych:  Good insight. Not depressed. Not anxious or agitated. Neurologic: EOMs intact. No facial asymmetry. No aphasia or slurred speech. Symmetrical strength, Sensation grossly intact. Alert and oriented X 4.     _______________________________________________________________________  Care Plan discussed with:    Comments   Patient Y    Family      RN Y    Care Manager                    Consultant:  JOHN PAUL GARIBAY MD   _______________________________________________________________________  Expected  Disposition:   Home with Family Y   HH/PT/OT/RN    SNF/LTC    PHU    ________________________________________________________________________  TOTAL TIME: 72   Minutes    Critical Care Provided     Minutes non procedure based      Comments    Y Reviewed previous records   >50% of visit spent in counseling and coordination of care Y Discussion with patient and/or family and questions answered       Given the patient's current clinical presentation, I have a high level of concern for decompensation if discharged from the ED. Complex decision making was performed which includes reviewing the patient's available past medical records, laboratory results, and Xray films. I have also directly communicated my plan and discussed this case with the involved ED physician.     ____________________________________________________________________  Desiree Pagan MD    Procedures: see electronic medical records for all procedures/Xrays and details which were not copied into this note but were reviewed prior to creation of Plan.     LAB DATA REVIEWED:    Recent Results (from the past 24 hour(s))   EKG, 12 LEAD, INITIAL    Collection Time: 03/13/21  9:25 PM   Result Value Ref Range    Ventricular Rate 123 BPM    Atrial Rate 133 BPM    QRS Duration 90 ms    Q-T Interval 268 ms QTC Calculation (Bezet) 383 ms    Calculated R Axis 26 degrees    Calculated T Axis -40 degrees    Diagnosis       Atrial fibrillation with rapid ventricular response  Abnormal QRS-T angle, consider primary T wave abnormality  No previous ECGs available     CBC WITH AUTOMATED DIFF    Collection Time: 03/13/21  9:47 PM   Result Value Ref Range    WBC 10.0 4.1 - 11.1 K/uL    RBC 4.48 4.10 - 5.70 M/uL    HGB 15.1 12.1 - 17.0 g/dL    HCT 42.4 36.6 - 50.3 %    MCV 94.6 80.0 - 99.0 FL    MCH 33.7 26.0 - 34.0 PG    MCHC 35.6 30.0 - 36.5 g/dL    RDW 12.1 11.5 - 14.5 %    PLATELET 299 979 - 836 K/uL    MPV 10.2 8.9 - 12.9 FL    NRBC 0.0 0  WBC    ABSOLUTE NRBC 0.00 0.00 - 0.01 K/uL    NEUTROPHILS 50 32 - 75 %    LYMPHOCYTES 38 12 - 49 %    MONOCYTES 9 5 - 13 %    EOSINOPHILS 2 0 - 7 %    BASOPHILS 1 0 - 1 %    IMMATURE GRANULOCYTES 0 0.0 - 0.5 %    ABS. NEUTROPHILS 5.0 1.8 - 8.0 K/UL    ABS. LYMPHOCYTES 3.8 (H) 0.8 - 3.5 K/UL    ABS. MONOCYTES 0.9 0.0 - 1.0 K/UL    ABS. EOSINOPHILS 0.2 0.0 - 0.4 K/UL    ABS. BASOPHILS 0.1 0.0 - 0.1 K/UL    ABS. IMM. GRANS. 0.0 0.00 - 0.04 K/UL    DF AUTOMATED     METABOLIC PANEL, COMPREHENSIVE    Collection Time: 03/13/21  9:47 PM   Result Value Ref Range    Sodium 136 136 - 145 mmol/L    Potassium 3.8 3.5 - 5.1 mmol/L    Chloride 103 97 - 108 mmol/L    CO2 29 21 - 32 mmol/L    Anion gap 4 (L) 5 - 15 mmol/L    Glucose 95 65 - 100 mg/dL    BUN 19 6 - 20 MG/DL    Creatinine 1.05 0.70 - 1.30 MG/DL    BUN/Creatinine ratio 18 12 - 20      GFR est AA >60 >60 ml/min/1.73m2    GFR est non-AA >60 >60 ml/min/1.73m2    Calcium 9.2 8.5 - 10.1 MG/DL    Bilirubin, total 0.6 0.2 - 1.0 MG/DL    ALT (SGPT) 28 12 - 78 U/L    AST (SGOT) 26 15 - 37 U/L    Alk.  phosphatase 50 45 - 117 U/L    Protein, total 7.3 6.4 - 8.2 g/dL    Albumin 3.8 3.5 - 5.0 g/dL    Globulin 3.5 2.0 - 4.0 g/dL    A-G Ratio 1.1 1.1 - 2.2     TROPONIN I    Collection Time: 03/13/21  9:47 PM   Result Value Ref Range    Troponin-I, Qt. <0.05 <0.05 ng/mL   MAGNESIUM    Collection Time: 03/13/21  9:47 PM   Result Value Ref Range    Magnesium 2.3 1.6 - 2.4 mg/dL   NT-PRO BNP    Collection Time: 03/13/21  9:47 PM   Result Value Ref Range    NT pro- <125 PG/ML

## 2021-03-22 ENCOUNTER — HOSPITAL ENCOUNTER (OUTPATIENT)
Dept: NON INVASIVE DIAGNOSTICS | Age: 56
Discharge: HOME | End: 2021-03-22
Attending: GENERAL ACUTE CARE HOSPITAL
Payer: COMMERCIAL

## 2021-03-22 LAB
ECHO AO ROOT DIAM: 3.78 CM
ECHO AV AREA PEAK VELOCITY: 2.56 CM2
ECHO AV PEAK GRADIENT: 5.37 MMHG
ECHO AV PEAK VELOCITY: 115.82 CM/S
ECHO IVC PROX: 1.52 CM
ECHO LA AREA 4C: 19.79 CM2
ECHO LA MAJOR AXIS: 3.92 CM
ECHO LA VOL 2C: 79.6 ML (ref 18–58)
ECHO LA VOL 4C: 53.23 ML (ref 18–58)
ECHO LA VOL BP: 73.21 ML (ref 18–58)
ECHO LV INTERNAL DIMENSION DIASTOLIC: 4.6 CM (ref 4.2–5.9)
ECHO LV INTERNAL DIMENSION SYSTOLIC: 2.78 CM
ECHO LV IVSD: 0.81 CM (ref 0.6–1)
ECHO LV MASS 2D: 117.9 G (ref 88–224)
ECHO LV POSTERIOR WALL DIASTOLIC: 0.79 CM (ref 0.6–1)
ECHO LVOT DIAM: 1.87 CM
ECHO LVOT PEAK GRADIENT: 4.69 MMHG
ECHO LVOT PEAK VELOCITY: 108.34 CM/S
ECHO MV A VELOCITY: 54.45 CM/S
ECHO MV AREA PHT: 2.66 CM2
ECHO MV E DECELERATION TIME (DT): 285.47 MS
ECHO MV E VELOCITY: 53.56 CM/S
ECHO MV E/A RATIO: 0.98
ECHO MV PRESSURE HALF TIME (PHT): 82.79 MS
ECHO RV INTERNAL DIMENSION: 4.12 CM
ECHO RV TAPSE: 2.94 CM (ref 1.5–2)
ECHO TV REGURGITANT MAX VELOCITY: 217.41 CM/S
ECHO TV REGURGITANT PEAK GRADIENT: 18.91 MMHG

## 2021-03-22 PROCEDURE — 93306 TTE W/DOPPLER COMPLETE: CPT

## 2022-03-18 PROBLEM — R00.2 PALPITATION: Status: ACTIVE | Noted: 2021-03-13

## 2022-03-18 PROBLEM — I48.91 RAPID ATRIAL FIBRILLATION (HCC): Status: ACTIVE | Noted: 2021-03-13

## 2022-03-19 PROBLEM — F10.10 ETOH ABUSE: Status: ACTIVE | Noted: 2021-03-13

## 2022-08-18 ENCOUNTER — TELEPHONE (OUTPATIENT)
Dept: CARDIOLOGY CLINIC | Age: 57
End: 2022-08-18

## 2022-08-18 NOTE — TELEPHONE ENCOUNTER
Kailey, this a nice gentleman with palps possible afib, call him and get him an appt with me week of August 29th  maybe that Tuesday or Wednesday  See if he has had a loop and or echo done  If not order both, call me with questions, call him today please to see up appt

## 2022-08-30 ENCOUNTER — OFFICE VISIT (OUTPATIENT)
Dept: CARDIOLOGY CLINIC | Age: 57
End: 2022-08-30
Payer: COMMERCIAL

## 2022-08-30 VITALS
WEIGHT: 152 LBS | HEART RATE: 65 BPM | OXYGEN SATURATION: 98 % | RESPIRATION RATE: 16 BRPM | HEIGHT: 67 IN | SYSTOLIC BLOOD PRESSURE: 120 MMHG | DIASTOLIC BLOOD PRESSURE: 80 MMHG | BODY MASS INDEX: 23.86 KG/M2

## 2022-08-30 DIAGNOSIS — I48.0 PAF (PAROXYSMAL ATRIAL FIBRILLATION) (HCC): Primary | ICD-10-CM

## 2022-08-30 DIAGNOSIS — R00.2 PALPITATION: ICD-10-CM

## 2022-08-30 PROCEDURE — 99203 OFFICE O/P NEW LOW 30 MIN: CPT | Performed by: SPECIALIST

## 2022-08-30 PROCEDURE — 93000 ELECTROCARDIOGRAM COMPLETE: CPT | Performed by: SPECIALIST

## 2022-08-30 NOTE — PROGRESS NOTES
Chelsea Colon     1965       Vitaly El MD, [facility]  Date of Visit-2022   PCP is Contreras Hughes MD   [facility] [place]  [facility]  HPI:  Chelsea Colon is a 64 y.o. male   New patient, self referred,second opinion  Has hx of PAF, had palps HR to 130 ,ER visit 3/14/21     Reji Leyva is well for familiar to me as a friend. He is active and loves to play golf and exercises regularly. He is generally quite healthy. He had an episode in 2022 and in 2022 each time getting poor hydration where he felt symptoms of tachycardia and palpitations similar to his previous atrial fibrillation. The episode in March lasted about 3 to 4 hours and the next episode in July lasted about 3 hours. He has had pill in the pocket with flecainide in the past.  These were the second and third episodes for him total.  Last when he had been playing golf it was a very hot day and he did not drink a lot of water. He has previously seen Dr. Lanny Harada with EP with recommendations for considering ablation. Patient is on no current daily medications. He was felt to be low risk for stroke. He had atrial fibrillation in 2021 hip resurfacing done in 2019 and had hematuria in  and  due to dehydration has not had a prior cath blood transfusion or ulcer. Review of hospital records show an admission on 3/13/2021 for PAF diverticulitis and he was to start Eliquis metoprolol and aspirin. He is a non-smoker drinks 2 drinks of alcohol a day 1 cup of coffee is a   with 2 children he likes exercise run bike lifts weights and golf. Family history mother is 68 with atrial fibrillation and high blood pressure. Father  at 80 of kidney disease his brother is 48 in good health. On review of systems he had previous mild diverticulitis and arthritis of the hips but the other 13 systems are negative.     EKG:   normal EKG, normal sinus rhythm normal axis and intervals   Assessment/Plan:   Patient Instructions   You have been scheduled for an echocardiogram. We will call with results. The primary encounter diagnosis was PAF (paroxysmal atrial fibrillation) ([address] A diagnosis of Palpitation was also pertinent to this visit. Charity Garcia has had a couple of episodes of atrial fibrillation. There is certainly documented in March 2021 and then more recent he has felt episodes this year which she describes as some palpitations over the last 13 months of the 2 definite episodes as mentioned above. He is at low risk without hypertension diabetes and a previously normal ejection fraction as he reports ( no recs, wont come up on CareEverywhere) . He does not recall a recent echocardiogram to assess his ejection fraction. I would agree that he does not previously need an ablation unless he prefers that I think it is patient choice based on symptomatology. The question now is with the increasing frequency of episodes should he consider an ablation procedure. Most of these episodes seem to have a trigger which is dehydration. He is also a daily alcohol user. What I have suggested is he work on hydration continue to monitor symptoms and we will get an echocardiogram and then reassess. He had sought a second opinion he has been seeing an outside EP he is certainly welcome after his echo to follow with the provider of his choice. I think it is likely that an ablation would be curative for him though we discussed it may take 2 procedures. He can also continue with pill in the pocket flecainide though I think the trend has been to go away from these approaches in the last few years when we talk to our EP colleagues.  We plan on getting the echocardiogram electively and then review things with him by phone to see how frequent his episodes are and what his thoughts are on medications ablation or conservative watchful waiting is certainly reasonable also. Thanks    Impression:    1. PAF (paroxysmal atrial fibrillation) ([address]    2. Palpitation       Future Appointments   Date Time Provider Benjamin Combs   10/3/2022  8:00 AM REESE CALL AMB      Patient Care Team:  Contreras Hughes MD as PCP - General (Family Medicine)  Renate Kussmaul, MD (Cardiovascular Disease Physician)       Allergies   Allergen Reactions    Pcn [Penicillins] Unknown (comments)          ROS:  REVIEW OF SYMPTOMS: A  full 12 system ROS is reviewed with aid of new patient form  see scanned new patient worksheet. Review of Systems   Constitutional:  Negative for diaphoresis, fever and malaise/fatigue. HENT:  Negative for ear pain, hearing loss, nosebleeds and tinnitus. Eyes:  Negative for blurred vision, double vision and pain. Respiratory:  Negative for cough, hemoptysis, sputum production, shortness of breath, wheezing and stridor. Cardiovascular:  Negative for chest pain, palpitations, orthopnea, claudication and leg swelling. Gastrointestinal:  Negative for abdominal pain, blood in stool, constipation, diarrhea, heartburn, nausea and vomiting. Genitourinary:  Negative for dysuria, frequency and urgency. Musculoskeletal:  Positive for joint pain. Negative for back pain and falls. Skin:  Negative for rash. Neurological:  Negative for dizziness, seizures, loss of consciousness, weakness and headaches. Endo/Heme/Allergies:  Does not bruise/bleed easily. Psychiatric/Behavioral:  Negative for depression, hallucinations and memory loss. The patient is not nervous/anxious and does not have insomnia. Exam and Labs:  Visit Vitals  /80   Pulse 71   Resp 16   Ht 5' 7\" (1.702 m)   Wt 152 lb (68.9 kg)   SpO2 98%   BMI 23.81 kg/m²      Constitutional:  NAD, comfortable ,   Head: NC,AT. Eyes: No scleral icterus. Neck:  Neck supple. No JVD present. Throat: moist mucous membranes.   Chest: Effort normal & normal respiratory excursion .Neurological: alert, conversant and oriented . Skin: Skin is not cold. No obvious systemic rash noted. Not diaphoretic. No erythema. Psychiatric:  Grossly normal mood and affect. Behavior appears normal. Extremities:  no clubbing or cyanosis. Abdomen: non distended    Lungs:breath sounds normal. No stridor. distress, wheezes or  Rales. Heart:normal rate, regular rhythm, normal S1, S2, no murmurs, rubs, clicks or gallops , PMI non displaced. Edema: Edema is none. Lab Results   Component Value Date/Time    Cholesterol, total 169 03/14/2021 02:10 AM    HDL Cholesterol 68 03/14/2021 02:10 AM    LDL, calculated 81.8 03/14/2021 02:10 AM    Triglyceride 96 03/14/2021 02:10 AM    CHOL/HDL Ratio 2.5 03/14/2021 02:10 AM     No results found for this or any previous visit. Lab Results   Component Value Date/Time    Sodium 138 03/14/2021 01:10 AM    Potassium 3.9 03/14/2021 01:10 AM    Chloride 107 03/14/2021 01:10 AM    CO2 26 03/14/2021 01:10 AM    Anion gap 5 03/14/2021 01:10 AM    Glucose 122 (H) 03/14/2021 01:10 AM    BUN 17 03/14/2021 01:10 AM    Creatinine 0.96 03/14/2021 01:10 AM    BUN/Creatinine ratio 18 03/14/2021 01:10 AM    GFR est AA >60 03/14/2021 01:10 AM    GFR est non-AA >60 03/14/2021 01:10 AM    Calcium 8.3 (L) 03/14/2021 01:10 AM      Wt Readings from Last 3 Encounters:   08/30/22 152 lb (68.9 kg)   03/13/21 150 lb (68 kg)      BP Readings from Last 3 Encounters:   08/30/22 120/80   03/14/21 131/76        No current outpatient medications on file. No current facility-administered medications for this visit. Impression see above.

## 2022-08-30 NOTE — Clinical Note
10/2/2022    Patient: Samuel Mason   YOB: 1965   Date of Visit: 8/30/2022     Wesley Mckeon MD  [address] [facility] [address]  Via Fax: [phone]    Dear Wesley Mckeon MD,      Thank you for referring Mr. Samuel Mason to [facility] for evaluation. My notes for this consultation are attached. If you have questions, please do not hesitate to call me. I look forward to following your patient along with you.       Sincerely,    Daphney Calderon MD

## 2022-10-03 ENCOUNTER — ANCILLARY PROCEDURE (OUTPATIENT)
Dept: CARDIOLOGY CLINIC | Age: 57
End: 2022-10-03
Payer: COMMERCIAL

## 2022-10-03 VITALS
DIASTOLIC BLOOD PRESSURE: 80 MMHG | HEIGHT: 67 IN | SYSTOLIC BLOOD PRESSURE: 120 MMHG | BODY MASS INDEX: 23.86 KG/M2 | WEIGHT: 152 LBS

## 2022-10-03 DIAGNOSIS — R00.2 PALPITATION: ICD-10-CM

## 2022-10-03 DIAGNOSIS — I48.0 PAF (PAROXYSMAL ATRIAL FIBRILLATION) (HCC): ICD-10-CM

## 2022-10-03 PROCEDURE — 93306 TTE W/DOPPLER COMPLETE: CPT | Performed by: SPECIALIST

## 2022-10-04 LAB
ECHO AO ASC DIAM: 4.2 CM
ECHO AO ASCENDING AORTA INDEX: 2.33 CM/M2
ECHO AO ROOT DIAM: 3.5 CM
ECHO AO ROOT INDEX: 1.94 CM/M2
ECHO AV PEAK GRADIENT: 6 MMHG
ECHO AV PEAK VELOCITY: 1.2 M/S
ECHO AV VELOCITY RATIO: 1
ECHO EST RA PRESSURE: 3 MMHG
ECHO LA DIAMETER INDEX: 2.28 CM/M2
ECHO LA DIAMETER: 4.1 CM
ECHO LA TO AORTIC ROOT RATIO: 1.17
ECHO LA VOL 2C: 66 ML (ref 18–58)
ECHO LA VOL 4C: 52 ML (ref 18–58)
ECHO LA VOL BP: 60 ML (ref 18–58)
ECHO LA VOL/BSA BIPLANE: 33 ML/M2 (ref 16–34)
ECHO LA VOLUME AREA LENGTH: 64 ML
ECHO LA VOLUME INDEX A2C: 37 ML/M2 (ref 16–34)
ECHO LA VOLUME INDEX A4C: 29 ML/M2 (ref 16–34)
ECHO LA VOLUME INDEX AREA LENGTH: 36 ML/M2 (ref 16–34)
ECHO LV E' LATERAL VELOCITY: 14 CM/S
ECHO LV E' SEPTAL VELOCITY: 8 CM/S
ECHO LV FRACTIONAL SHORTENING: 34 % (ref 28–44)
ECHO LV INTERNAL DIMENSION DIASTOLE INDEX: 2.78 CM/M2
ECHO LV INTERNAL DIMENSION DIASTOLIC: 5 CM (ref 4.2–5.9)
ECHO LV INTERNAL DIMENSION SYSTOLIC INDEX: 1.83 CM/M2
ECHO LV INTERNAL DIMENSION SYSTOLIC: 3.3 CM
ECHO LV ISOVOLUMETRIC RELAXATION TIME (IVRT): 102.8 MS
ECHO LV IVSD: 0.9 CM (ref 0.6–1)
ECHO LV MASS 2D: 158.2 G (ref 88–224)
ECHO LV MASS INDEX 2D: 87.9 G/M2 (ref 49–115)
ECHO LV POSTERIOR WALL DIASTOLIC: 0.9 CM (ref 0.6–1)
ECHO LV RELATIVE WALL THICKNESS RATIO: 0.36
ECHO LVOT PEAK GRADIENT: 5 MMHG
ECHO LVOT PEAK VELOCITY: 1.2 M/S
ECHO MV "A" WAVE DURATION: 157 MSEC
ECHO MV A VELOCITY: 0.49 M/S
ECHO MV E DECELERATION TIME (DT): 191.3 MS
ECHO MV E VELOCITY: 0.72 M/S
ECHO MV E/A RATIO: 1.47
ECHO MV E/E' LATERAL: 5.14
ECHO MV E/E' RATIO (AVERAGED): 7.07
ECHO MV E/E' SEPTAL: 9
ECHO PVEIN A DURATION: 159.9 MS
ECHO PVEIN A VELOCITY: 0.2 M/S
ECHO RIGHT VENTRICULAR SYSTOLIC PRESSURE (RVSP): 26 MMHG
ECHO RV BASAL DIMENSION: 3.9 CM
ECHO RV FREE WALL PEAK S': 13 CM/S
ECHO RV TAPSE: 2.9 CM (ref 1.7–?)
ECHO TV REGURGITANT MAX VELOCITY: 2.38 M/S
ECHO TV REGURGITANT PEAK GRADIENT: 23 MMHG

## 2022-10-04 NOTE — PROGRESS NOTES
Your echocardiogram looks great, pleased with the results  Having any more episodes of atrial fibrillation? Do you ever use flecainide pill in the pocket? We have thoughts about ablation or do want to continue to watch things and see if this recurs? We will talk about all those issues and just let me know a good time to give you a call.

## 2023-05-08 ENCOUNTER — TELEPHONE (OUTPATIENT)
Age: 58
End: 2023-05-08

## 2023-05-16 ENCOUNTER — TELEPHONE (OUTPATIENT)
Age: 58
End: 2023-05-16

## 2023-05-21 ENCOUNTER — PATIENT MESSAGE (OUTPATIENT)
Age: 58
End: 2023-05-21

## 2023-05-22 ENCOUNTER — TELEPHONE (OUTPATIENT)
Age: 58
End: 2023-05-22

## 2023-05-22 NOTE — TELEPHONE ENCOUNTER
Spoke with patient. 2 patient identifiers used. Per patient, he has been in Afib for 13 hours. He states usually he is able to take his medications and convert back but this time he did not. States he has taken a total of 200 mg of Flecainide he states he started taking it yesterday 50 mg each dose every 2-3 hours and it has been 7 hours since last dose. States he had Metoprolol 25 mg at 9:50 last night. Also states he took Eliquis 5 mg 8 hours ago. He would like to know recommendations. States BP was an average of 131/88, . Denies chest pain or SOB. Reported this to TONE Addison. Per NP patient is to take another 25 mg of metoprolol and if this does not help he is to go to the ER. Also stated to have an appointment with Dr. Mily Charlton scheduled. Notified patient of above recommendation per NP. Patient verbalized understanding. Appointment scheduled.      Future Appointments   Date Time Provider Huong Constanza   7/5/2023  9:20 AM MD GURDEEP Coles AMB

## 2023-05-25 ENCOUNTER — TELEPHONE (OUTPATIENT)
Age: 58
End: 2023-05-25

## 2023-05-25 NOTE — TELEPHONE ENCOUNTER
Received records request from [address] [place] [address] requested records to number provided. Confirmation of fax received.     Requesting records: Most recent echo and EKG report    Fax: [phone]

## 2023-06-01 ENCOUNTER — OFFICE VISIT (OUTPATIENT)
Age: 58
End: 2023-06-01
Payer: COMMERCIAL

## 2023-06-01 VITALS
OXYGEN SATURATION: 97 % | DIASTOLIC BLOOD PRESSURE: 80 MMHG | RESPIRATION RATE: 16 BRPM | HEIGHT: 67 IN | SYSTOLIC BLOOD PRESSURE: 120 MMHG | HEART RATE: 61 BPM | WEIGHT: 155 LBS | BODY MASS INDEX: 24.33 KG/M2

## 2023-06-01 DIAGNOSIS — I48.0 PAF (PAROXYSMAL ATRIAL FIBRILLATION) (HCC): Primary | ICD-10-CM

## 2023-06-01 DIAGNOSIS — R00.2 PALPITATIONS: ICD-10-CM

## 2023-06-01 PROCEDURE — 99205 OFFICE O/P NEW HI 60 MIN: CPT | Performed by: INTERNAL MEDICINE

## 2023-06-01 RX ORDER — MULTIVITAMIN
1 CAPSULE ORAL DAILY
COMMUNITY

## 2023-06-01 NOTE — PROGRESS NOTES
[facility]                    Cardiac Electrophysiology Office Care Note     [x]Initial Encounter     []Follow-up    Patient Name: Shahram Leiva - JJ - VIU:[id number]  Primary Cardiologist: Timothy Tavarez MD  Consulting Cardiologist: Sofie Fernandes MD     Reason for encounter: PAF    HPI:       Shahram Leiva is a 62 y.o. male with PMH significant for PAF and palpitation   He has about 40 episodes a year and uses flecainide and it resolved in about 1 hour. It can last 10 hrs otherwise  He has palpitation but no other symptoms  His mother is my patient  He went to [place] and thought about getting pulse field ablation trial with Dr Skylar Mccain but does not like to do stress test or ILR for the study  He is active and he has no chest pain     Assessment and Plan      Diagnosis Orders   1. PAF (paroxysmal atrial fibrillation) (HCC)        2. Palpitations            PAF with long duration and frequent recurrences  So far he is getting by with pill in pocket option   He is candidate and wants ablation   We discussed forms of energy use  I recommend cryoablation   He will think about it and call back  Risks and benefits discussed  Risks include but are not limited to bleeding in the groin, infection in the groin and/or heart valves, fistula between the groin arteries and veins, valvular damage, diaphragmatic paralysis, aortic dissection, heart attack, stroke, blood clot in the leg, pulmonary embolism, lung collapse (pneumothorax or hemothorax), heart collapse (pericardial tamponade), death. The added risks for left atrial ablation may be atrial-esophageal fistula, pulmonary vein stenoses, kidney failure (from contrast injection), higher risk of bleeding, stroke and heart attack. Elective or emergency surgery may be required to repair some of these complications. Prolonged hospitalization would be required.   General anesthesia and transeptal catheterization may be required for the procedure

## 2023-06-07 ENCOUNTER — TELEPHONE (OUTPATIENT)
Age: 58
End: 2023-06-07

## 2023-06-07 NOTE — TELEPHONE ENCOUNTER
Called and spoke to patient -ID X2  Spoke to pt he has gotten good info from both places on risks/benefits etc which was reviewed   He is considering his options

## 2023-06-07 NOTE — TELEPHONE ENCOUNTER
----- Message from Renetta Fountain RN sent at 6/2/2023 10:22 AM EDT -----  Regarding: FW: Dr. Ashley Patiño at [facility]: [phone]    ----- Message -----  From: Nima Oneal RN  Sent: 6/2/2023   9:14 AM EDT  To: Renetta Fountain RN  Subject: FW: Dr. Ashley Patiño at [facility]                       ----- Message -----  From: Natacha Garner  Sent: 6/2/2023   8:59 AM EDT  To: Marianna Morris Clinical Staff  Subject: Dr. Ashley Patiño at [facility],    Good morning. I had a meeting with Dr. Ashley Patiño last week and Dr Juanis Madden this week. I need to decide whether or not to participate in the [facility] Clinical trial this summer with Dr. Ashley Patiño or have Dr. Juanis Madden perform cryoablation in early August.    It would be great to chat with you. Is there a time over the weekend or evening next week that I can call you? If yes, what is the best phone number to call you? I appreciate it.     Joceline Carr

## 2023-06-12 ENCOUNTER — PATIENT MESSAGE (OUTPATIENT)
Age: 58
End: 2023-06-12

## 2023-06-12 DIAGNOSIS — I48.0 PAF (PAROXYSMAL ATRIAL FIBRILLATION) (HCC): Primary | ICD-10-CM

## 2023-06-12 DIAGNOSIS — R00.2 PALPITATIONS: ICD-10-CM

## 2023-07-18 NOTE — PROGRESS NOTES
[facility] Cardiology  Cardiac Electrophysiology Clinic Care Note                  []Initial visit     [x]Established visit     Patient Name: Sukhdeep Olsen - GYS:[id number] - [id number]  Primary Cardiologist: Alvaro Mckay MD  Electrophysiologist: Sean Coleman MD     Reason for visit: H&P update    HPI:  Mr. Josh Caceres is a 62 y.o. male who presents for H&P update prior to upcoming planned ablation of PAF (scheduled for 08/23/2023). Historically, he has about 40 episodes of PAF per year, usually resolves within an hour of using flecainide prn, but otherwise lasts 10 hours. Symptomatic with palpitations during episodes. He hasn't had any recurrence in the past 40 days. Able to be active, denies chest pain. Echo in 10/2022 showed LVEF 55-60% with mild TR. BP controlled. [id number] 0, uses Eliquis 2.5 mg po bid prn. Previous:  Considered pulse field ablation trial with Dr. Shyam Galeano in [facility], but opted against it due to not wanting stress test or ILR as required by study. Assessment and Plan     PAF: Typically lasts 1 hour after flecainide prn, but some episodes last up to 10 hours. Symptomatic with palpitations. Reviewed risks/benefits of AF ablation. He is unsure whether he ultimately wants to proceed, is still considering pulse field ablation. He'd like to keep things as scheduled for now, will call us >1 week prior to ablation if he opts to cancel. Labs ordered. Chest CTA done 07/26/2023. Anticoagulation: Uses low dose Eliquis prn. He will require Eliquis 5 mg po bid x 4 weeks post ablation. Follow up in EP clinic post ablation.     Future Appointments   Date Time Provider [address]   8/23/2023  1:30 PM [facility] CATH LAB 3 [facility]   9/6/2023 10:00 AM Kaylah Caro, ASHLEY - TONE SALAMANCA AMB         ____________________________________________________________    Cardiac testing    10/03/22    TRANSTHORACIC ECHOCARDIOGRAM (TTE) COMPLETE

## 2023-07-26 ENCOUNTER — HOSPITAL ENCOUNTER (OUTPATIENT)
Facility: HOSPITAL | Age: 58
Discharge: HOME | End: 2023-07-29
Attending: INTERNAL MEDICINE
Payer: COMMERCIAL

## 2023-07-26 DIAGNOSIS — R00.2 PALPITATIONS: ICD-10-CM

## 2023-07-26 DIAGNOSIS — I48.0 PAF (PAROXYSMAL ATRIAL FIBRILLATION) (HCC): ICD-10-CM

## 2023-07-26 PROCEDURE — 6360000004 HC RX CONTRAST MEDICATION: Performed by: INTERNAL MEDICINE

## 2023-07-26 PROCEDURE — 71275 CT ANGIOGRAPHY CHEST: CPT

## 2023-07-26 RX ADMIN — IOPAMIDOL 100 ML: 755 INJECTION, SOLUTION INTRAVENOUS at 07:54

## 2023-08-04 ENCOUNTER — OFFICE VISIT (OUTPATIENT)
Age: 58
End: 2023-08-04
Payer: COMMERCIAL

## 2023-08-04 VITALS
SYSTOLIC BLOOD PRESSURE: 120 MMHG | HEIGHT: 67 IN | RESPIRATION RATE: 16 BRPM | OXYGEN SATURATION: 99 % | HEART RATE: 56 BPM | DIASTOLIC BLOOD PRESSURE: 82 MMHG | BODY MASS INDEX: 23.86 KG/M2 | WEIGHT: 152 LBS

## 2023-08-04 DIAGNOSIS — I48.0 PAF (PAROXYSMAL ATRIAL FIBRILLATION) (HCC): ICD-10-CM

## 2023-08-04 DIAGNOSIS — Z79.01 ANTICOAGULATED: ICD-10-CM

## 2023-08-04 DIAGNOSIS — I48.0 PAF (PAROXYSMAL ATRIAL FIBRILLATION) (HCC): Primary | ICD-10-CM

## 2023-08-04 PROCEDURE — 99214 OFFICE O/P EST MOD 30 MIN: CPT | Performed by: NURSE PRACTITIONER

## 2023-08-04 RX ORDER — CREATINE 100 %
POWDER (GRAM) MISCELLANEOUS
COMMUNITY

## 2023-08-04 RX ORDER — FLECAINIDE ACETATE 100 MG/1
TABLET ORAL AS NEEDED
COMMUNITY
Start: 2022-07-07

## 2023-08-04 RX ORDER — CIPROFLOXACIN 500 MG/1
500 TABLET, FILM COATED ORAL EVERY 12 HOURS
COMMUNITY
Start: 2023-07-17

## 2023-08-04 RX ORDER — SILDENAFIL CITRATE 20 MG/1
TABLET ORAL AS NEEDED
COMMUNITY
Start: 2023-07-17

## 2023-08-04 ASSESSMENT — PATIENT HEALTH QUESTIONNAIRE - PHQ9
SUM OF ALL RESPONSES TO PHQ QUESTIONS 1-9: 0
SUM OF ALL RESPONSES TO PHQ QUESTIONS 1-9: 0
SUM OF ALL RESPONSES TO PHQ9 QUESTIONS 1 & 2: 0
SUM OF ALL RESPONSES TO PHQ QUESTIONS 1-9: 0
SUM OF ALL RESPONSES TO PHQ QUESTIONS 1-9: 0
1. LITTLE INTEREST OR PLEASURE IN DOING THINGS: 0
2. FEELING DOWN, DEPRESSED OR HOPELESS: 0

## 2023-08-04 NOTE — PATIENT INSTRUCTIONS
Your EP STUDY AND AFIB ABLATION procedure has been scheduled for 8/23/23 at 130 PM, at [facility].     Please report to Admitting Department by 1130 AM, or 2 hours prior to your scheduled procedure. Please bring a list of your current medications and medication bottles, if able, to the hospital on this day. You will be unable to drive after your procedure so please make sure to bring someone with you to your procedure. You will need to have nothing to eat or drink after midnight, the night prior to your procedure. You may have small sips of water, if needed, to take with your medication. You will need labs drawn prior to your procedure. Please go to have this done today. You should stop your medication, ELIQUIS, 2 days prior to your scheduled procedure. After your procedure, you will need to follow up with Rochelle Kathleen NP.  Your follow-up appointment has been scheduled for 9/6/23 at 1000 am.

## 2023-08-15 ENCOUNTER — PREP FOR PROCEDURE (OUTPATIENT)
Age: 58
End: 2023-08-15

## 2023-08-15 RX ORDER — SODIUM CHLORIDE 0.9 % (FLUSH) 0.9 %
5-40 SYRINGE (ML) INJECTION EVERY 12 HOURS SCHEDULED
Status: CANCELLED | OUTPATIENT
Start: 2023-08-16

## 2023-08-15 RX ORDER — SODIUM CHLORIDE 0.9 % (FLUSH) 0.9 %
5-40 SYRINGE (ML) INJECTION PRN
Status: CANCELLED | OUTPATIENT
Start: 2023-08-15

## 2023-08-15 RX ORDER — SODIUM CHLORIDE 9 MG/ML
INJECTION, SOLUTION INTRAVENOUS PRN
Status: CANCELLED | OUTPATIENT
Start: 2023-08-15

## 2023-08-16 ENCOUNTER — TELEPHONE (OUTPATIENT)
Age: 58
End: 2023-08-16

## 2023-08-16 NOTE — TELEPHONE ENCOUNTER
Verified patient with two types of identifiers. Patient would like to reschedule AF ablation for next available time. Patient rescheduled for 11/9/23 at 730 am at [address]. Pre procedure appointment with Michael Ty NP on 10/20/23. Patient verbalized understanding and will call with any other questions.       Future Appointments   Date Time Provider [address] [place] [address]   8/23/2023  1:30 PM [address] CATH LAB 3 [facility] [address]   10/20/2023  2:20 PM ASHLEY Munoz - TONE SALAMANCA AMB

## 2023-09-24 ENCOUNTER — TELEPHONE (OUTPATIENT)
Age: 58
End: 2023-09-24

## 2023-09-25 ENCOUNTER — CLINICAL DOCUMENTATION (OUTPATIENT)
Age: 58
End: 2023-09-25

## 2023-09-25 NOTE — PROGRESS NOTES
Still PAF with flecainide 100 mg bid  Will need ECG before going to 150 mg bid   Ablation is coming up

## 2023-09-27 ENCOUNTER — TELEPHONE (OUTPATIENT)
Age: 58
End: 2023-09-27

## 2023-09-27 NOTE — TELEPHONE ENCOUNTER
Pt. Returning Kenneth's phone call. Stated he is on the phone all day, it will be hard to get a hold of him.      Phone - [phone]

## 2023-10-20 ENCOUNTER — TELEPHONE (OUTPATIENT)
Age: 58
End: 2023-10-20

## 2023-10-20 NOTE — TELEPHONE ENCOUNTER
Left message for patient to reschedule appointment- Hector Sher is not in the office. Please try to reschedule for next Thursday at [address].      h

## 2023-10-28 NOTE — PROGRESS NOTES
[facility] Cardiology  Cardiac Electrophysiology Clinic Care Note                  []Initial visit     [x]Established visit     Patient Name: Bhupendra Irwin - NHA:[birth year] - [id number]  Primary Cardiologist: Taryn Brito MD  Electrophysiologist: Amber Hargrove MD     Reason for visit: H&P update    HPI:  Mr. Penelope Chan is a 62 y.o. male who presents for H&P update prior to upcoming planned ablation of PAF (scheduled for 11/09/2023). Currently on flecainide prn only. Historically, he has episodes of PAF every 43 days on average, usually resolves within an hour of using flecainide prn, but otherwise lasts 10-15 hours. Symptomatic with palpitations during episodes. He hasn't had any recurrence in the past 40 days. Able to be active, denies chest pain. Echo in 10/2022 showed LVEF 55-60% with mild TR. BP controlled. [id number] 0, uses Eliquis 2.5 mg po bid prn. Previous:  Considered pulse field ablation trial with Dr. Mark Garcia in [facility], but opted against it due to not wanting stress test or ILR as required by study. Assessment and Plan     PAF: Typically lasts 1 hour after flecainide prn, but some episodes last up to 10 hours. Symptomatic with palpitations. Continue flecainide prn for now. Reviewed risks/benefits of AF ablation. At this point, he'd like to push ablation out & potentially wait for pulse field ablation to be available outside of research setting. Discussed postponing indefinitely vs rescheduling; he would like to reschedule just in case episodes increase in frequency or duration. Anticoagulation: Uses low dose Eliquis prn. He will require Eliquis 5 mg po bid x 4 weeks post ablation. Follow up in EP clinic prior to new ablation date in a few months.     Future Appointments   Date Time Provider [facility]   11/22/2023 10:00 AM ASHLEY Mackey NP AMB

## 2023-10-31 ENCOUNTER — PREP FOR PROCEDURE (OUTPATIENT)
Age: 58
End: 2023-10-31

## 2023-10-31 RX ORDER — SODIUM CHLORIDE 0.9 % (FLUSH) 0.9 %
5-40 SYRINGE (ML) INJECTION PRN
Status: CANCELLED | OUTPATIENT
Start: 2023-10-31

## 2023-10-31 RX ORDER — SODIUM CHLORIDE 0.9 % (FLUSH) 0.9 %
5-40 SYRINGE (ML) INJECTION EVERY 12 HOURS SCHEDULED
Status: CANCELLED | OUTPATIENT
Start: 2023-10-31

## 2023-10-31 RX ORDER — SODIUM CHLORIDE 9 MG/ML
INJECTION, SOLUTION INTRAVENOUS PRN
Status: CANCELLED | OUTPATIENT
Start: 2023-10-31

## 2023-11-02 ENCOUNTER — OFFICE VISIT (OUTPATIENT)
Age: 58
End: 2023-11-02
Payer: COMMERCIAL

## 2023-11-02 VITALS
HEIGHT: 67 IN | WEIGHT: 155.4 LBS | DIASTOLIC BLOOD PRESSURE: 78 MMHG | SYSTOLIC BLOOD PRESSURE: 138 MMHG | RESPIRATION RATE: 16 BRPM | HEART RATE: 68 BPM | OXYGEN SATURATION: 100 % | BODY MASS INDEX: 24.39 KG/M2

## 2023-11-02 DIAGNOSIS — I48.0 PAROXYSMAL ATRIAL FIBRILLATION (HCC): Primary | ICD-10-CM

## 2023-11-02 DIAGNOSIS — I36.1 NONRHEUMATIC TRICUSPID VALVE REGURGITATION: ICD-10-CM

## 2023-11-02 DIAGNOSIS — R00.2 PALPITATIONS: ICD-10-CM

## 2023-11-02 PROCEDURE — 99214 OFFICE O/P EST MOD 30 MIN: CPT | Performed by: NURSE PRACTITIONER

## 2023-11-02 ASSESSMENT — PATIENT HEALTH QUESTIONNAIRE - PHQ9
2. FEELING DOWN, DEPRESSED OR HOPELESS: 0
1. LITTLE INTEREST OR PLEASURE IN DOING THINGS: 0
SUM OF ALL RESPONSES TO PHQ QUESTIONS 1-9: 0
SUM OF ALL RESPONSES TO PHQ9 QUESTIONS 1 & 2: 0
SUM OF ALL RESPONSES TO PHQ QUESTIONS 1-9: 0

## 2023-11-02 NOTE — PROGRESS NOTES
Room #: 2    May not want to proceed with ablation. Only having few episodes of Afib that only last about 5 hours. Chief Complaint   Patient presents with    Atrial Fibrillation    Follow-up     Updated H&P prior to ablation       Vitals:    11/02/23 1033   BP: 138/78   Site: Left Upper Arm   Position: Sitting   Cuff Size: Medium Adult   Pulse: 68   Resp: 16   SpO2: 100%   Weight: 70.5 kg (155 lb 6.4 oz)   Height: 1.702 m (5' 7\")         Chest pain:  NO  Shortness of breath:  NO  Edema: NO  Palpitations, skipped beats, rapid heartbeat:  YES  Dizziness:  NO    1. Have you been to the ER, urgent care clinic since your last visit? Hospitalized since your last visit? NO    2. Have you seen or consulted any other health care providers outside of the [address] [place] [address] since your last visit? Include any pap smears or colon screening.  NO      Refills:  NO

## 2023-11-13 ENCOUNTER — TELEPHONE (OUTPATIENT)
Age: 58
End: 2023-11-13

## 2023-11-13 NOTE — TELEPHONE ENCOUNTER
----- Message from ASHLEY Bashir - NP sent at 11/2/2023 11:30 AM EDT -----  Regarding: Reschedule ablation  Patient wanted to reschedule AF ablation, push it out for a few months. He's hoping that pulse field ablation will become more widely available. Please cancel his appt with me that's on for later this month.     Future Appointments  11/22/2023 10:00 AM   ASHLEY Roca# GURDEEP SALAMANCA AMB

## 2023-12-07 ENCOUNTER — TELEPHONE (OUTPATIENT)
Age: 58
End: 2023-12-07

## 2023-12-07 NOTE — TELEPHONE ENCOUNTER
Called patient about scheduling Afib ablation but no answer. Left voicemail with request for return call.

## 2024-05-19 ENCOUNTER — PATIENT MESSAGE (OUTPATIENT)
Age: 59
End: 2024-05-19

## 2024-05-20 RX ORDER — FLECAINIDE ACETATE 100 MG/1
100 TABLET ORAL DAILY PRN
Qty: 60 TABLET | Refills: 1 | Status: SHIPPED | OUTPATIENT
Start: 2024-05-20

## 2024-05-20 NOTE — TELEPHONE ENCOUNTER
Kenneth Silva, RN 2024 9:57 AM EDT    Should he get a refill on both?  ----- Message -----  From: Kelly Swanson RN  Sent: 2024 9:07 AM EDT  To: Kenneth Silva RN; Sadie Case LPN  Subject: FW: Prescriptions about to        ----- Message -----  From: Rene Moeller  Sent: 2024 12:24 PM EDT  To: Phil Aguilera [facility] Clinical Staff  Subject: Prescriptions about to       Judah Orozco. I hope that all is well.   First, I am very happy to report that I have not had an AFIB episode for the past 191 days.   Having said that, I checked my prescriptions for metoprolol and flecanide in case in need them again in the future. They both  this month. Should I get them refilled?  Thanks,  Mundo

## 2024-05-20 NOTE — TELEPHONE ENCOUNTER
Yes, please refill both just in case.    Please also let him know that [facility] does have pulsed field ablation capability now.  If he has recurrence & wants to schedule, Dr. Gaming could plan for that method.

## 2024-06-11 RX ORDER — FLECAINIDE ACETATE 100 MG/1
100 TABLET ORAL DAILY PRN
Qty: 90 TABLET | Refills: 1 | OUTPATIENT
Start: 2024-06-11

## 2025-06-29 RX ORDER — METOPROLOL TARTRATE 25 MG/1
25 TABLET, FILM COATED ORAL DAILY PRN
Qty: 60 TABLET | Refills: 0 | Status: SHIPPED | OUTPATIENT
Start: 2025-06-29 | End: 2025-07-01 | Stop reason: SDUPTHER

## 2025-06-29 RX ORDER — FLECAINIDE ACETATE 100 MG/1
100 TABLET ORAL DAILY PRN
Qty: 60 TABLET | Refills: 0 | Status: SHIPPED | OUTPATIENT
Start: 2025-06-29 | End: 2025-07-01 | Stop reason: SDUPTHER

## 2025-06-30 ENCOUNTER — TELEPHONE (OUTPATIENT)
Age: 60
End: 2025-06-30

## 2025-06-30 NOTE — TELEPHONE ENCOUNTER
Verified patient with two types of identifiers.   Patient states his afib episode this weekend last for 12 hours.  It was his first episode in 593 days.  He took his PRN metoprolol and flecainide and episode resolved.  Patient scheduled to see Pma WAYNE tomorrow at [facility]; time and location confirmed.  Patient verbalized understanding and will call with any other questions.        Future Appointments   Date Time Provider Department Center   7/1/2025  8:40 AM Pam Blank, APRN - NP [facility] AMB         ----- Message from Dr. Markus Acuna MD sent at 6/29/2025  1:33 PM EDT -----  Ghislanie Orozco  This patient called me as he went into A-fib with RVR and he took flecainide and metoprolol which you had prescribed in the past  I told him to continue Eliquis 5 mg p.o. twice daily--- on follow-up with our team    Kenneth can you please get appointment with EP as early as possible patient would need discussion about ablation  Unfortunately has not seen our team for at least a year  Thanks Markus

## 2025-06-30 NOTE — PROGRESS NOTES
None.    TECHNIQUE: Multislice helical CT of the chest was performed after the  administration of mL of Isovue-370. Enhanced, retrospectively EKG gated thin  section images of the heart were acquired. Coronal and sagittal, cross-sectional  maximum intensity projection, and 3-D surface-rendered reformations of the  pulmonary veins and left atrium were generated. CT dose reduction was achieved  through use of a standardized protocol tailored for this examination and  automatic exposure control for dose modulation.    FINDINGS:  Pulmonary veins:  Pulmonary venous anatomy is conventional, with two left and two right pulmonary  veins. There are no accessory pulmonary veins. No overt thrombus in the left  atrial appendage.    Pulmonary vein ostial measurements:  Right superior pulmonary vein (RSPV): 16 x 21 mm. Immediate branching.  Right inferior pulmonary vein (RIPV): 17 x 20 mm. The first branch arises 8 mm  from the ostium.  Left superior pulmonary vein (LSPV): 18 x 12 mm. No early branching.  Left inferior pulmonary vein (LIPV): 11 x 16 mm. No early branching.    Chest: The heart is mildly enlarged. No significant coronary artery  calcifications. The ascending thoracic aorta is ectatic (38 mm), but not frankly  aneurysmal. Bovine arch is a normal variant. The main pulmonary artery is  enlarged (33 mm), suggesting pulmonary artery hypertension. There is mild pectus  excavatum. No thoracic lymphadenopathy.    The lungs are clear. The central airways are patent. No pneumothorax or pleural  effusion. The visualized upper abdomen is normal.    Impression  1. Pulmonary veins as described above.  2. Pulmonary artery enlargement suggests PA hypertension.    MRI Result (most recent):  No results found for this or any previous visit from the past 3650 days.          Current meds:  Current Outpatient Medications   Medication Sig Dispense Refill    VITAMIN D PO Take by mouth daily      flecainide (TAMBOCOR) 100 MG tablet Take 1

## 2025-07-01 ENCOUNTER — OFFICE VISIT (OUTPATIENT)
Age: 60
End: 2025-07-01
Payer: COMMERCIAL

## 2025-07-01 VITALS
WEIGHT: 154.3 LBS | HEART RATE: 66 BPM | BODY MASS INDEX: 24.17 KG/M2 | DIASTOLIC BLOOD PRESSURE: 92 MMHG | OXYGEN SATURATION: 99 % | SYSTOLIC BLOOD PRESSURE: 130 MMHG

## 2025-07-01 DIAGNOSIS — Z79.01 ANTICOAGULATED: ICD-10-CM

## 2025-07-01 DIAGNOSIS — I77.810 MILD ASCENDING AORTA DILATATION: ICD-10-CM

## 2025-07-01 DIAGNOSIS — I36.1 NONRHEUMATIC TRICUSPID VALVE REGURGITATION: ICD-10-CM

## 2025-07-01 DIAGNOSIS — I48.0 PAROXYSMAL ATRIAL FIBRILLATION (HCC): Primary | ICD-10-CM

## 2025-07-01 PROCEDURE — 93000 ELECTROCARDIOGRAM COMPLETE: CPT | Performed by: NURSE PRACTITIONER

## 2025-07-01 PROCEDURE — 99214 OFFICE O/P EST MOD 30 MIN: CPT | Performed by: NURSE PRACTITIONER

## 2025-07-01 RX ORDER — FLECAINIDE ACETATE 100 MG/1
100 TABLET ORAL DAILY PRN
Qty: 60 TABLET | Refills: 0 | Status: SHIPPED | OUTPATIENT
Start: 2025-07-01 | End: 2025-07-03 | Stop reason: SDUPTHER

## 2025-07-01 RX ORDER — METOPROLOL TARTRATE 25 MG/1
25 TABLET, FILM COATED ORAL DAILY PRN
Qty: 60 TABLET | Refills: 0 | Status: SHIPPED | OUTPATIENT
Start: 2025-07-01 | End: 2025-07-03 | Stop reason: SDUPTHER

## 2025-07-02 ENCOUNTER — PATIENT MESSAGE (OUTPATIENT)
Age: 60
End: 2025-07-02

## 2025-07-03 RX ORDER — FLECAINIDE ACETATE 100 MG/1
100 TABLET ORAL DAILY PRN
Qty: 60 TABLET | Refills: 1 | Status: SHIPPED | OUTPATIENT
Start: 2025-07-03

## 2025-07-03 RX ORDER — METOPROLOL TARTRATE 25 MG/1
25 TABLET, FILM COATED ORAL DAILY PRN
Qty: 60 TABLET | Refills: 1 | Status: SHIPPED | OUTPATIENT
Start: 2025-07-03

## 2025-07-03 NOTE — TELEPHONE ENCOUNTER
Request for lopressor 25 mg, flecainide 100 mg.  Last office visit 7/1/25, next office visit 11/4/25. Refills per verbal order from Pam Blank NP.

## 2025-07-07 ENCOUNTER — PATIENT MESSAGE (OUTPATIENT)
Age: 60
End: 2025-07-07

## 2025-07-07 DIAGNOSIS — I48.0 PAROXYSMAL ATRIAL FIBRILLATION (HCC): Primary | ICD-10-CM

## 2025-07-08 ENCOUNTER — TELEPHONE (OUTPATIENT)
Age: 60
End: 2025-07-08

## 2025-07-08 NOTE — TELEPHONE ENCOUNTER
We can go ahead & send a referral to Dr. Plummer at [facility] for PAF.    Currently uses flecainide & metoprolol tartrate prn.  With him having had a few episodes so recently, would recommend starting Toprol XL 12.5 mg po daily with flecainide 50 mg po bid, at least for the next few months until things are more quiescent.  Please schedule him for an ECG only appt when he comes in for his echo to recheck if he agrees to start flecainide & Toprol XL.        Future Appointments   Date Time Provider Department Center   7/14/2025  9:00 AM BSAGUSTIN KUNZ ECHO 4 GURDEEP [facility]DA[facility]   10/21/2025  8:20 AM Jovanna Davenport MD [facility] BS AMB

## 2025-07-08 NOTE — TELEPHONE ENCOUNTER
Faxed referral, office notes to Dr. Plummer [facility] at fax number [phone].  Fax confirmation received.

## 2025-07-09 RX ORDER — FLECAINIDE ACETATE 100 MG/1
50 TABLET ORAL 2 TIMES DAILY
Qty: 60 TABLET | Refills: 1
Start: 2025-07-09

## 2025-07-09 RX ORDER — METOPROLOL SUCCINATE 25 MG/1
12.5 TABLET, EXTENDED RELEASE ORAL DAILY
Qty: 30 TABLET | Refills: 1 | Status: SHIPPED | OUTPATIENT
Start: 2025-07-09

## 2025-07-10 NOTE — TELEPHONE ENCOUNTER
There's no contraindication to him doing high interval training while on the meds, but he'll likely notice that his max HR is lower.

## 2025-07-11 NOTE — TELEPHONE ENCOUNTER
Faxed referral, demographics, office note to Dr. Plummer, [facility] at fax number [phone].  Fax confirmation received.

## 2025-07-21 ENCOUNTER — PATIENT MESSAGE (OUTPATIENT)
Age: 60
End: 2025-07-21

## 2025-07-21 NOTE — TELEPHONE ENCOUNTER
It hasn't been finalized yet, but the preliminary report indicates LVEF 55-60% with grade I diastolic dysfunction, mild PAULINO, trace TR, & mild MS.    This appears to be somewhat similar to his echo that was done in 10/2022.  Mildly dilated LA is noted now, but the actual measurement listed appears to be improved compared to 2022.  TR has gone from mild to trace.